# Patient Record
Sex: MALE | Race: WHITE | Employment: FULL TIME | URBAN - METROPOLITAN AREA
[De-identification: names, ages, dates, MRNs, and addresses within clinical notes are randomized per-mention and may not be internally consistent; named-entity substitution may affect disease eponyms.]

---

## 2017-04-05 ENCOUNTER — HOSPITAL ENCOUNTER (OUTPATIENT)
Dept: RADIATION ONCOLOGY | Age: 77
Discharge: HOME | End: 2017-04-05
Payer: MEDICARE

## 2017-04-05 DIAGNOSIS — C61 PROSTATE CANCER (HCC): ICD-10-CM

## 2017-04-05 LAB — PSA SERPL-MCNC: <0.1 NG/ML

## 2017-04-05 PROCEDURE — 84403 ASSAY OF TOTAL TESTOSTERONE: CPT | Performed by: RADIOLOGY

## 2017-04-05 PROCEDURE — 36415 COLL VENOUS BLD VENIPUNCTURE: CPT | Performed by: RADIOLOGY

## 2017-04-05 PROCEDURE — 84153 ASSAY OF PSA TOTAL: CPT | Performed by: RADIOLOGY

## 2017-04-06 ENCOUNTER — HOSPITAL ENCOUNTER (OUTPATIENT)
Dept: RADIATION ONCOLOGY | Age: 77
Discharge: HOME | End: 2017-04-06
Payer: MEDICARE

## 2017-04-06 VITALS
HEART RATE: 69 BPM | OXYGEN SATURATION: 94 % | RESPIRATION RATE: 18 BRPM | TEMPERATURE: 97.6 F | BODY MASS INDEX: 34 KG/M2 | SYSTOLIC BLOOD PRESSURE: 136 MMHG | DIASTOLIC BLOOD PRESSURE: 79 MMHG | WEIGHT: 223.6 LBS

## 2017-04-06 DIAGNOSIS — C61 PROSTATE CANCER (HCC): Primary | ICD-10-CM

## 2017-04-06 LAB
COMMENT, TESC2: ABNORMAL
TESTOST SERPL-MCNC: 207 NG/DL (ref 348–1197)

## 2017-04-06 PROCEDURE — 99211 OFF/OP EST MAY X REQ PHY/QHP: CPT

## 2017-04-06 RX ORDER — SILDENAFIL 100 MG/1
100 TABLET, FILM COATED ORAL AS NEEDED
Qty: 5 TAB | Refills: 3 | Status: SHIPPED | OUTPATIENT
Start: 2017-04-06 | End: 2017-08-28 | Stop reason: SDUPTHER

## 2017-04-06 NOTE — PROGRESS NOTES
Patient: Teddi Harada MRN: [id number]  SSN: [id number]    YOB: 1940  Age: 78 y.o. Sex: male      Other Providers:  William Holley MD.     DIAGNOSIS: Intermediate risk prostate cancer.  [id number].  PSA 6.5 (Density 0.14).  GS 4+3=7 in right mid 2 cores with 70% and 60% and PNI.  GS 3+4=7 in 25% and 40% also with PNI.  Right base GS 3+3=6 in 10%.   5/12 cores total.     PREVIOUS TREATMENT:  1)  TRUS Biopsy 12/16/15  2) 5/2/16:  HDR single fraction boost, 15 Gy  3) 6/21/16:  45 Gy whole pelvis radiation. HISTORY OF PRESENT ILLNESS:  Joana Klinefelter is a 70 y.o. male who I am seeing at the request of Dr. Linda Verde for prostate cancer back after my original consultation 1/28/16 and completion of radiation 6/21/16.  He was originally evaluated after routine screening documented an elevated PSA on serial values.      His pertinent past medical history includes hearing loss, HTN, and GERD.  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  He is  and a remote former smoker.  He has no family history of prostate cancer.    PSA History:    PSA 1/8/15 = 4.7  PSA 3/2/15 = 4.7  PSA 8/10/15 = 5.2, Free 0.84, %16.2  PSA 11/12/15 = 6.5,Free 1.01, %15.5    Biopsy Results:  12/16/15 45 cc gland.     GS 4+3=7 in right mid 2 cores with 70% and 60% and PNI.  GS 3+4=7 in 25% and 40% also with PNI.  Right base GS 3+3=6 in 10%.   5/12 cores total.      He was ultimately seen back in the urology office to discuss the results of the biopsy and management options 1/4/16.  Both radiation and surgical options were discussed and he has been referred to me to further discuss radiation as an option for management of his prostate cancer.  With his age, Dr. Linda Verde did not feel he was a good surgical candidate and with his disease, not a good seed candidate.  He also started him on a 6 month Eligard injection 1/4/16 and referred him to our office to discuss radiation.   He is having some mild fatigue and has noted some hot flashes (variable and less than even 1 per day and not severe).   We talked about several options but focused on RTOG 0924 as a potential option for him.  I ordered MRI and Bone scan and presented him in conference and we collectively agreed to proceed with radiation to his prostate and seminal vesicles. During radiation, he did well without complaints other than minimal frequency increase week 1 attributed to drinking more water.  He began having irritated hemorrhoids week 2 and recommended Preparation H and Witch hazel pads which was helpful.  He started probiotic week 3 and had good control of bowel function.  Week 4 having waxing and waning of constipation and diarrhea.   Week 5 feeling better. Completed all therapy wihtout treatment breaks. Mr. Lorena Castro returns today for his 10 months follow up following completion of his radiation therapy. He is doing very well and pleased with his care. He reports that he is basically back to his baseline and has been so since 5 months out. His bowel symptoms have resolved and reports he is having formed stools. He denies frequency, urgency, pain, hematuria or leakage. He states his flow is good. He has nocturia x 2. He went off flomax but started again with recurrent weakness and frequency after seeing Dr. Corinna Daniel in Feb. In regards to his energy, he states that his stamina is good during the day, able to continue working 2 jobs, but at night he sleeps longer than prior to radiation. He denies cough or shortness of breath. Denies pain. Overall all, Mr. Lorena Castro is doing well. He is not able to get an erection at all at anytime which does bother him but this is his only complaint. GENITOURINARY REVIEW OF SYSTEMS:  An EPIC 26 was completed pretreatment.  His AUA score was 8. His only complaint was frequency about the half the time. AUA at 1 month:  5  AUA at 3 months: 7  AUA at 10 months:  3. Back on Flomax.      UPDATED PAST MEDICAL HISTORY:  Since our prior encounter, Verónica Edmond has not been hospitalized. There have been no significant changes to the medical history. MEDICATIONS:     Current Outpatient Prescriptions:     sildenafil citrate (VIAGRA) 100 mg tablet, Take 1 Tab by mouth as needed. , Disp: 5 Tab, Rfl: 3    tamsulosin (FLOMAX) 0.4 mg capsule, Take 1 Cap by mouth daily for 90 days. , Disp: 90 Cap, Rfl: 3    triamterene-hydroCHLOROthiazide (MAXZIDE) 37.5-25 mg per tablet, Take 1 Tab by mouth daily. , Disp: 90 Tab, Rfl: 1    atenolol (TENORMIN) 50 mg tablet, Take 1 Tab by mouth daily. , Disp: 90 Tab, Rfl: 2    omeprazole (PRILOSEC) 20 mg capsule, Take 1 Cap by mouth daily. TAKE 1 CAPSULE BY MOUTH DAILY, Disp: 90 Cap, Rfl: 2    GLUCOSAMINE/D3/BOSWELLIA ROSA (OSTEO BI-FLEX, 5-LOXIN, PO), Take 1 Tab by mouth daily. , Disp: , Rfl:     multivitamin (ONE A DAY) tablet, Take 1 Tab by mouth daily. , Disp: , Rfl:     tadalafil (CIALIS) 5 mg tablet, Take 5 mg by mouth daily. (Patient taking differently: Take 5 mg by mouth as needed.), Disp: 30 Tab, Rfl: 2    Cetirizine 10 mg cap, Take 1 Tab by mouth daily. , Disp: , Rfl:     ALLERGIES:   No Known Allergies    PHYSICAL EXAMINATION:   ECOG Performance status 0  VITAL SIGNS:   Visit Vitals    /79    Pulse 69    Temp 97.6 °F (36.4 °C)    Resp 18    Wt 101.4 kg (223 lb 9.6 oz)    SpO2 94%    BMI 34 kg/m2        GENERAL: The patient is well-developed, ambulatory, alert and in no acute distress. HEENT: Head is normocephalic, atraumatic. NECK: Neck is supple with no masses. CARDIOVASCULAR: Heart is regular rate and rhythm. There are no murmurs rubs or gallups. RESPIRATORY: Lungs are clear to auscultation. There is normal respiratory effort. GASTROINTESTINAL: The abdomen is soft, non-tender. Digital rectal examination: deferred  MUSCULOSKELETAL: Extremities reveal no cyanosis, clubbing or edema.      LABORATORY:   09/23/2016:  PSA  <0.1 with Testosterone 75  4/5/17:  PSA <0.1, Testosterone 207    RADIOLOGY:  No recent imaging. TUMOR STATUS:  Responding based on PSA. IMPRESSION:  Orville Sanches is a 78 y.o. male now 10 months out from treatment. He is recovering as anticipated from his prior course of radiotherapy. He received his last Eligard injection on January 4, 2016. His PSA is <0.1 with a testosterone of 75. He denies hot flashes. Previous  urinary and bowel symptoms have resolved. He has good energy, working two jobs. PLAN:    1) Follow up with Dr. Estephania Walsh in 4 months and with our office in 6 months. Will alternate appointments with urology. 2) PSA/Testosterone prior to each visit. Plan to check serial values every 3 months for the first 2 years and q6 months thereafter generally. 3) Viagra script today for ED. Will defer further management to Dr. Estephania Walsh. Portions of this note were copied from prior encounters and reviewed for accuracy, currency, and represent documentation and tasks completed during this encounter. I verify and attest these portions to be unchanged from prior visits.     Ashtyn Roger MD  04/06/17

## 2017-04-06 NOTE — PROGRESS NOTES
Pt here today for FUP post RT to the prostate and has a very low AUA urinary score of 3/35 with mild c/o frequency and nocturia. 4/5/17 PSA <0.1, Testosterone 207. Pt is currently taking Flomax daily. Pt will return in 6 months with labs prior. He sees Dr. Vicki Corea 8/10/17.

## 2017-10-05 ENCOUNTER — HOSPITAL ENCOUNTER (OUTPATIENT)
Dept: RADIATION ONCOLOGY | Age: 77
Discharge: HOME | End: 2017-10-05
Payer: MEDICARE

## 2017-10-05 DIAGNOSIS — C61 PROSTATE CANCER (HCC): ICD-10-CM

## 2017-10-05 LAB — PSA SERPL-MCNC: <0.1 NG/ML

## 2017-10-05 PROCEDURE — 84403 ASSAY OF TOTAL TESTOSTERONE: CPT | Performed by: RADIOLOGY

## 2017-10-05 PROCEDURE — 36415 COLL VENOUS BLD VENIPUNCTURE: CPT | Performed by: RADIOLOGY

## 2017-10-05 PROCEDURE — 84153 ASSAY OF PSA TOTAL: CPT | Performed by: RADIOLOGY

## 2017-10-06 LAB — TESTOST SERPL-MCNC: 245 NG/DL (ref 264–916)

## 2017-10-12 ENCOUNTER — HOSPITAL ENCOUNTER (OUTPATIENT)
Dept: RADIATION ONCOLOGY | Age: 77
Discharge: HOME | End: 2017-10-12
Payer: MEDICARE

## 2017-10-12 VITALS
HEART RATE: 67 BPM | SYSTOLIC BLOOD PRESSURE: 127 MMHG | BODY MASS INDEX: 32.46 KG/M2 | TEMPERATURE: 98.6 F | DIASTOLIC BLOOD PRESSURE: 73 MMHG | RESPIRATION RATE: 18 BRPM | OXYGEN SATURATION: 95 % | WEIGHT: 213.5 LBS

## 2017-10-12 DIAGNOSIS — C61 PROSTATE CANCER (HCC): Primary | ICD-10-CM

## 2017-10-12 PROCEDURE — 99211 OFF/OP EST MAY X REQ PHY/QHP: CPT

## 2017-10-12 NOTE — PROGRESS NOTES
Pt here today for FUP post RT to the prostate which ended 6/2016. Pt stated he missed that last appt with Dr. Attila Glasgow and needs to reschedule it. His AUA score is 2/35 with his only c/o being nocturia.

## 2017-10-12 NOTE — PROGRESS NOTES
Patient: Rosaura Garay MRN: [id number]  SSN: [id number]    YOB: 1940  Age: 78 y.o. Sex: male      Other Providers:  Dominique Elizalde MD.     DIAGNOSIS: Intermediate risk prostate cancer.  [id number].  PSA 6.5 (Density 0.14).  GS 4+3=7 in right mid 2 cores with 70% and 60% and PNI.  GS 3+4=7 in 25% and 40% also with PNI.  Right base GS 3+3=6 in 10%.   5/12 cores total.     PREVIOUS TREATMENT:  1)  TRUS Biopsy 12/16/15  2) 5/2/16:  HDR single fraction boost, 15 Gy  3) 6/21/16:  45 Gy whole pelvis radiation. HISTORY OF PRESENT ILLNESS:  Suleiman Landry is a 70year old male initially seen by Dr. Leyla Guillaume at the request of Dr. Glendy Huffman for prostate cancer back after my original consultation 1/28/16 and completion of radiation 6/21/16.  He was originally evaluated after routine screening documented an elevated PSA on serial values.      His pertinent past medical history includes hearing loss, HTN, and GERD.  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  He is  and a remote former smoker.  He has no family history of prostate cancer.    PSA History:    PSA 1/8/15 = 4.7  PSA 3/2/15 = 4.7  PSA 8/10/15 = 5.2, Free 0.84, %16.2  PSA 11/12/15 = 6.5,Free 1.01, %15.5    Biopsy Results:  12/16/15 45 cc gland.     GS 4+3=7 in right mid 2 cores with 70% and 60% and PNI.  GS 3+4=7 in 25% and 40% also with PNI.  Right base GS 3+3=6 in 10%.   5/12 cores total.      He was ultimately seen back in the urology office to discuss the results of the biopsy and management options 1/4/16.  Both radiation and surgical options were discussed and he has been referred to me to further discuss radiation as an option for management of his prostate cancer.  With his age, Dr. Glendy Huffman did not feel he was a good surgical candidate and with his disease, not a good seed candidate.  He also started him on a 6 month Eligard injection 1/4/16 and referred him to our office to discuss radiation.   He is having some mild fatigue and has noted some hot flashes (variable and less than even 1 per day and not severe).   We talked about several options but focused on RTOG 0924 as a potential option for him.  I ordered MRI and Bone scan and presented him in conference and we collectively agreed to proceed with radiation to his prostate and seminal vesicles. During radiation, he did well without complaints other than minimal frequency increase week 1 attributed to drinking more water.  He began having irritated hemorrhoids week 2 and recommended Preparation H and Witch hazel pads which was helpful.  He started probiotic week 3 and had good control of bowel function.  Week 4 having waxing and waning of constipation and diarrhea.   Week 5 feeling better. Completed all therapy wihtout treatment breaks. INTERVAL HISTORY: Mr. Micheal Garcia returns today for his 12 month follow up following completion of his radiation therapy. He is doing very well. He reports that he is back to his baseline and has been so since 5 months out. He denies frequency, urgency, pain, hematuria or leakage. He states his flow is good. He has nocturia x 2. He went off Flomax, but started again with recurrent weakness and frequency after seeing Dr. Grace Kirby in February 2017. He continues to have occasional urgency with his bowels. He reports this has been an ongoing issue for about a year. In regards to his energy, he states that his stamina is good during the day, able to continue working 2 jobs. He denies cough or shortness of breath. Denies pain. Overall all, Mr. Micheal Garcia is doing well. He is not able to get an erection at all at anytime which does bother him. He has tried Viagra in the past with no result. He is planning a trip to [place] next week and [facility] in January 2018. GENITOURINARY REVIEW OF SYSTEMS:  An EPIC 26 was completed pretreatment.  His AUA score was 8. His only complaint was frequency about the half the time.   AUA at 1 month:  5  AUA at 3 months: 7  AUA at 10 months:  3  Back on Flomax  AUA at 16 months: 2    UPDATED PAST MEDICAL HISTORY:  Since our prior encounter, Isaak Barlow has not been hospitalized. There have been no significant changes to the medical history. MEDICATIONS:     Current Outpatient Prescriptions:     atenolol (TENORMIN) 50 mg tablet, Take 1 Tab by mouth daily. , Disp: 90 Tab, Rfl: 2    sildenafil citrate (VIAGRA) 100 mg tablet, Take 1 Tab by mouth as needed. , Disp: 5 Tab, Rfl: 3    omeprazole (PRILOSEC) 20 mg capsule, Take 1 Cap by mouth daily. TAKE 1 CAPSULE BY MOUTH DAILY, Disp: 90 Cap, Rfl: 2    triamterene-hydroCHLOROthiazide (MAXZIDE) 37.5-25 mg per tablet, Take 1 Tab by mouth daily. , Disp: 90 Tab, Rfl: 1    metoprolol tartrate (LOPRESSOR) 50 mg tablet, Take 1 Tab by mouth daily. , Disp: 90 Tab, Rfl: 1    GLUCOSAMINE/D3/BOSWELLIA ROSA (OSTEO BI-FLEX, 5-LOXIN, PO), Take 1 Tab by mouth daily. , Disp: , Rfl:     multivitamin (ONE A DAY) tablet, Take 1 Tab by mouth daily. , Disp: , Rfl:     Cetirizine 10 mg cap, Take 1 Tab by mouth daily. , Disp: , Rfl:     ALLERGIES:   No Known Allergies    PHYSICAL EXAMINATION:   ECOG Performance status 0  VITAL SIGNS:   There were no vitals taken for this visit. GENERAL: The patient is well-developed, ambulatory, alert and in no acute distress. HEENT: Head is normocephalic, atraumatic. NECK: Neck is supple with no masses. CARDIOVASCULAR: Heart is regular rate and rhythm. There are no murmurs rubs or gallups. RESPIRATORY: Lungs are clear to auscultation. There is normal respiratory effort. LABORATORY:   09/23/2016:  PSA  <0.1 with Testosterone 75  4/5/17:  PSA <0.1 with Testosterone 207  10/05/17: PSA <0.1 with Testosterone 245    RADIOLOGY:  No recent imaging. TUMOR STATUS:  Responding based on PSA. IMPRESSION:  Isaak Barlow is a 78 y.o. male now 16 months out from treatment. He is recovering as anticipated from his prior course of radiotherapy.  He received his last Eligard injection on January 4, 2016. His PSA is <0.1 with a testosterone of 245. His urinary function is back to his baseline. He denies any residual side effects related to his previous radiation therapy. PLAN:    1) Follow up with Dr. Prahci Bauer in 3 months and with our office in 6 months. Will alternate appointments with urology. 2) PSA/Testosterone prior to each visit. Plan to check serial values every 3 months for the first 2 years and q6 months thereafter generally. Koby Leach NP  10/12/17       Portions of this note were copied from prior encounters and reviewed for accuracy, currency, and represent documentation and tasks completed during this encounter. I verify and attest these portions to be unchanged from prior visits.

## 2018-04-12 ENCOUNTER — HOSPITAL ENCOUNTER (OUTPATIENT)
Dept: RADIATION ONCOLOGY | Age: 78
Discharge: HOME | End: 2018-04-12
Payer: MEDICARE

## 2018-04-12 DIAGNOSIS — C61 PROSTATE CANCER (HCC): Primary | ICD-10-CM

## 2018-04-12 DIAGNOSIS — C61 PROSTATE CANCER (HCC): ICD-10-CM

## 2018-04-12 PROCEDURE — 36415 COLL VENOUS BLD VENIPUNCTURE: CPT | Performed by: RADIOLOGY

## 2018-04-12 PROCEDURE — 84153 ASSAY OF PSA TOTAL: CPT | Performed by: RADIOLOGY

## 2018-04-12 PROCEDURE — 84403 ASSAY OF TOTAL TESTOSTERONE: CPT | Performed by: RADIOLOGY

## 2018-04-13 LAB
PSA SERPL DL<=0.01 NG/ML-MCNC: 0.02 NG/ML (ref 0–4)
TESTOST SERPL-MCNC: 247 NG/DL (ref 264–916)

## 2018-04-19 ENCOUNTER — HOSPITAL ENCOUNTER (OUTPATIENT)
Dept: RADIATION ONCOLOGY | Age: 78
Discharge: HOME | End: 2018-04-19
Payer: MEDICARE

## 2018-04-19 VITALS
HEART RATE: 71 BPM | BODY MASS INDEX: 32.12 KG/M2 | DIASTOLIC BLOOD PRESSURE: 85 MMHG | WEIGHT: 216.9 LBS | OXYGEN SATURATION: 95 % | TEMPERATURE: 97.7 F | RESPIRATION RATE: 16 BRPM | SYSTOLIC BLOOD PRESSURE: 136 MMHG | HEIGHT: 69 IN

## 2018-04-19 DIAGNOSIS — R30.0 BURNING WITH URINATION: Primary | ICD-10-CM

## 2018-04-19 DIAGNOSIS — R30.0 BURNING WITH URINATION: ICD-10-CM

## 2018-04-19 DIAGNOSIS — C61 PROSTATE CANCER (HCC): ICD-10-CM

## 2018-04-19 LAB
APPEARANCE UR: CLEAR
BACTERIA URNS QL MICRO: ABNORMAL /HPF
BILIRUB UR QL: NEGATIVE
CASTS URNS QL MICRO: 0 /LPF
COLOR UR: YELLOW
CRYSTALS URNS QL MICRO: 0 /LPF
EPI CELLS #/AREA URNS HPF: 0 /HPF
GLUCOSE UR STRIP.AUTO-MCNC: NEGATIVE MG/DL
HGB UR QL STRIP: ABNORMAL
KETONES UR QL STRIP.AUTO: NEGATIVE MG/DL
LEUKOCYTE ESTERASE UR QL STRIP.AUTO: NEGATIVE
MUCOUS THREADS URNS QL MICRO: 0 /LPF
NITRITE UR QL STRIP.AUTO: NEGATIVE
PH UR STRIP: 7 [PH] (ref 5–9)
PROT UR STRIP-MCNC: NEGATIVE MG/DL
RBC #/AREA URNS HPF: ABNORMAL /HPF
SP GR UR REFRACTOMETRY: 1.02 (ref 1–1.02)
UA: UC IF INDICATED,UAUC: ABNORMAL
UROBILINOGEN UR QL STRIP.AUTO: 0.2 EU/DL (ref 0.2–1)
WBC URNS QL MICRO: 0 /HPF

## 2018-04-19 PROCEDURE — 99211 OFF/OP EST MAY X REQ PHY/QHP: CPT

## 2018-04-19 PROCEDURE — 81001 URINALYSIS AUTO W/SCOPE: CPT | Performed by: NURSE PRACTITIONER

## 2018-04-19 NOTE — PROGRESS NOTES
Pt here today for FUP post RT to the prostate which ended 6/21/16, he also had HDR 5/2/16. His final ADT was 1/4/16.  4/12/18 PSA 0.022, Testosterone 247. Pt has an AUA score of 22/35 with multiple urinary c/o.

## 2018-04-19 NOTE — PROGRESS NOTES
Patient: Stephanie Muniz MRN: [id number]  SSN: [id number]    YOB: 1940  Age: 78 y.o. Sex: male      Other Providers:  Carolynn Arango MD.     DIAGNOSIS: Intermediate risk prostate cancer.  [id number].  PSA 6.5 (Density 0.14).  GS 4+3=7 in right mid 2 cores with 70% and 60% and PNI.  GS 3+4=7 in 25% and 40% also with PNI.  Right base GS 3+3=6 in 10%.   5/12 cores total.     PREVIOUS TREATMENT:  1)  TRUS Biopsy 12/16/15  2) 5/2/16:  HDR single fraction boost, 15 Gy  3) 6/21/16:  45 Gy whole pelvis radiation. HISTORY OF PRESENT ILLNESS:  Grecia Santiago is a 70year old male initially seen by Dr. Griselda Ibarra at the request of Dr. Barbara Frankel for prostate cancer back after my original consultation 1/28/16 and completion of radiation 6/21/16.  He was originally evaluated after routine screening documented an elevated PSA on serial values.      His pertinent past medical history includes hearing loss, HTN, and GERD.  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  He is  and a remote former smoker.  He has no family history of prostate cancer.    PSA History:    PSA 1/8/15 = 4.7  PSA 3/2/15 = 4.7  PSA 8/10/15 = 5.2, Free 0.84, %16.2  PSA 11/12/15 = 6.5,Free 1.01, %15.5    Biopsy Results:  12/16/15 45 cc gland.     GS 4+3=7 in right mid 2 cores with 70% and 60% and PNI.  GS 3+4=7 in 25% and 40% also with PNI.  Right base GS 3+3=6 in 10%.   5/12 cores total.      He was ultimately seen back in the urology office to discuss the results of the biopsy and management options 1/4/16.  Both radiation and surgical options were discussed and he has been referred to me to further discuss radiation as an option for management of his prostate cancer.  With his age, Dr. Barbara Frankel did not feel he was a good surgical candidate and with his disease, not a good seed candidate.  He also started him on a 6 month Eligard injection 1/4/16 and referred him to our office to discuss radiation.   He is having some mild fatigue and has noted some hot flashes (variable and less than even 1 per day and not severe).   We talked about several options but focused on RTOG 0924 as a potential option for him.  I ordered MRI and Bone scan and presented him in conference and we collectively agreed to proceed with radiation to his prostate and seminal vesicles. During radiation, he did well without complaints other than minimal frequency increase week 1 attributed to drinking more water.  He began having irritated hemorrhoids week 2 and recommended Preparation H and Witch hazel pads which was helpful.  He started probiotic week 3 and had good control of bowel function.  Week 4 having waxing and waning of constipation and diarrhea.   Week 5 feeling better. Completed all therapy wihtout treatment breaks. INTERVAL HISTORY: Mr. Esperanza Hackett returns today 22 months following completion of his radiation therapy. Mr. Esperanza Hackett complains of new onset urinary symptoms x2 weeks. He reports having urinary frequency, urgency, intermittency and incomplete emptying. He states he noticed hematuria x3 days that has since resolved. His urine is essentially clear. He denies fever. He does complain of fatigue. He has an AUA symptom score of 22/35 and is \"unhappy\" with is urinary function. He continues to have occasional urgency with his bowels. He reports this has been an ongoing issue since receiving radiation. In regards to his energy, he states that his stamina is worse for the last couple of weeks but continues working 2 jobs. He denies cough or shortness of breath. He is not able to get an erection at all at anytime which does bother him. He has tried Viagra in the past with no result. GENITOURINARY REVIEW OF SYSTEMS:  An EPIC 26 was completed pretreatment.  His AUA score was 8. His only complaint was frequency about the half the time.   AUA at 1 month:  5  AUA at 3 months: 7  AUA at 10 months:  3  Back on Flomax  AUA at 16 months: 2  AUA at 27 months: 22    UPDATED PAST MEDICAL HISTORY:  Since our prior encounter, Jacqueline Sterling has not been hospitalized. There have been no significant changes to the medical history. MEDICATIONS:     Current Outpatient Prescriptions:     metoprolol tartrate (LOPRESSOR) 50 mg tablet, Take 1 Tab by mouth daily. , Disp: 90 Tab, Rfl: 3    omeprazole (PRILOSEC) 20 mg capsule, Take 1 Cap by mouth daily. TAKE 1 CAPSULE BY MOUTH DAILY, Disp: 90 Cap, Rfl: 3    triamterene-hydroCHLOROthiazide (MAXZIDE) 37.5-25 mg per tablet, Take 1 Tab by mouth daily. , Disp: 90 Tab, Rfl: 3    GLUCOSAMINE/D3/BOSWELLIA ROSA (OSTEO BI-FLEX, 5-LOXIN, PO), Take 1 Tab by mouth daily. , Disp: , Rfl:     multivitamin (ONE A DAY) tablet, Take 1 Tab by mouth daily. , Disp: , Rfl:     Cetirizine 10 mg cap, Take 1 Tab by mouth daily. , Disp: , Rfl:     ALLERGIES:   No Known Allergies    PHYSICAL EXAMINATION:   ECOG Performance status 0  VITAL SIGNS:   Visit Vitals    /85 (BP 1 Location: Left arm, BP Patient Position: Sitting)    Pulse 71    Temp 97.7 °F (36.5 °C)    Resp 16    Ht 5' 9\" (1.753 m)    Wt 216 lb 14.4 oz (98.4 kg)    SpO2 95%    BMI 32.03 kg/m2        GENERAL: The patient is well-developed, ambulatory, alert and in no acute distress. LABORATORY:   09/23/2016:  PSA  <0.1 with Testosterone 75  4/5/17:  PSA <0.1 with Testosterone 207  10/05/17: PSA <0.1 with Testosterone 245  04/12/18: PSA 0.022 with testosterone of 247    RADIOLOGY:  No recent imaging. TUMOR STATUS:  Responding based on PSA. IMPRESSION:  Jacqueline Sterling is a 78 y.o. male now 16 months out from treatment. He is recovering as anticipated from his prior course of radiotherapy. He received his last Eligard injection on January 4, 2016. His PSA is <0.1 with a testosterone of 245. His urinary function is back to his baseline. He denies any residual side effects related to his previous radiation therapy.      PLAN:    1) Mr. Bernice White complains of new onset urinary symptoms x2 weeks. He reports having urinary frequency, urgency, intermittency and incomplete emptying. He states he noticed hematuria x3 days that has since resolved. His urine is essentially clear. We discussed radiation cystitis or UTI. We will obtain a urine sample and have asked him to push fluids and take motrin 200mg TID for 2-3 days for comfort. He denies fever. He does complain of fatigue. I will call him with result  Follow up with Dr. Alexa Hinkle in 3 months and with our office in 6 months. Will alternate appointments with urology. 2) PSA/Testosterone prior to each visit. He is scheduled with Dr. Alexa Hinkle in October. I will see him again in 3 months. At that time will change to yearly, alternating visits with dr. Alexa Hinkle (to be seen every 6 months). Marleni Salinas NP  04/19/18       Portions of this note were copied from prior encounters and reviewed for accuracy, currency, and represent documentation and tasks completed during this encounter. I verify and attest these portions to be unchanged from prior visits.

## 2018-07-19 ENCOUNTER — HOSPITAL ENCOUNTER (OUTPATIENT)
Dept: RADIATION ONCOLOGY | Age: 78
Discharge: HOME | End: 2018-07-19
Payer: MEDICARE

## 2018-07-19 DIAGNOSIS — C61 PROSTATE CANCER (HCC): ICD-10-CM

## 2018-07-19 LAB — PSA SERPL-MCNC: <0.1 NG/ML

## 2018-07-19 PROCEDURE — 84403 ASSAY OF TOTAL TESTOSTERONE: CPT | Performed by: NURSE PRACTITIONER

## 2018-07-19 PROCEDURE — 36415 COLL VENOUS BLD VENIPUNCTURE: CPT | Performed by: NURSE PRACTITIONER

## 2018-07-19 PROCEDURE — 84153 ASSAY OF PSA TOTAL: CPT | Performed by: NURSE PRACTITIONER

## 2018-07-20 LAB — TESTOST SERPL-MCNC: 265 NG/DL (ref 264–916)

## 2018-07-27 ENCOUNTER — HOSPITAL ENCOUNTER (OUTPATIENT)
Dept: RADIATION ONCOLOGY | Age: 78
Discharge: HOME | End: 2018-07-27
Payer: MEDICARE

## 2018-07-27 VITALS
HEART RATE: 72 BPM | RESPIRATION RATE: 16 BRPM | TEMPERATURE: 98.2 F | BODY MASS INDEX: 33.33 KG/M2 | HEIGHT: 68 IN | OXYGEN SATURATION: 95 % | DIASTOLIC BLOOD PRESSURE: 82 MMHG | WEIGHT: 219.9 LBS | SYSTOLIC BLOOD PRESSURE: 131 MMHG

## 2018-07-27 DIAGNOSIS — C61 PROSTATE CANCER (HCC): Primary | ICD-10-CM

## 2018-07-27 PROCEDURE — 99211 OFF/OP EST MAY X REQ PHY/QHP: CPT

## 2018-07-27 NOTE — PROGRESS NOTES
Pt here today for FUP post RT to the prostate which ended 6/21/16. He also completed HDR (High Dose Radiation) 5/2/16. His 7/19/18 PSA <0.1, testosterone 265. He will see Dr. Nan Osgood 10/30/18. Pt will return in 4/2019 with labs prior.

## 2018-07-27 NOTE — PROGRESS NOTES
Patient: Isaak Barlow MRN: [id number]  SSN: [id number] YOB: 1940  Age: 70 y.o. Sex: male Other Providers:  Kelly Gonzáles MD.  
 
DIAGNOSIS: Intermediate risk prostate cancer.  [id number].  PSA 6.5 (Density 0.14).  GS 4+3=7 in right mid 2 cores with 70% and 60% and PNI.  GS 3+4=7 in 25% and 40% also with PNI.  Right base GS 3+3=6 in 10%.   5/12 cores total.  
 
PREVIOUS TREATMENT: 
1)  TRUS Biopsy 12/16/15 
2) 5/2/16:  HDR single fraction boost, 15 Gy 3) 6/21/16:  45 Gy whole pelvis radiation. HISTORY OF PRESENT ILLNESS:  Guicho Starr is a 70year old male initially seen by Dr. Benny Hadley at the request of Dr. Brisa Edmonds for prostate cancer back after my original consultation 1/28/16 and completion of radiation 6/21/16.  He was originally evaluated after routine screening documented an elevated PSA on serial values.  His pertinent past medical history includes hearing loss, HTN, and GERD.  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  He is  and a remote former smoker.  He has no family history of prostate cancer.   
PSA History:   
PSA 1/8/15 = 4.7 PSA 3/2/15 = 4.7 PSA 8/10/15 = 5.2, Free 0.84, %16.2 PSA 11/12/15 = 6.5,Free 1.01, %15.5 Biopsy Results:  12/16/15 45 cc gland.    
GS 4+3=7 in right mid 2 cores with 70% and 60% and PNI.  GS 3+4=7 in 25% and 40% also with PNI.  Right base GS 3+3=6 in 10%.   5/12 cores total.  He was ultimately seen back in the urology office to discuss the results of the biopsy and management options 1/4/16.  Both radiation and surgical options were discussed and he has been referred to me to further discuss radiation as an option for management of his prostate cancer.  With his age, Dr. Brisa Edmonds did not feel he was a good surgical candidate and with his disease, not a good seed candidate.  He also started him on a 6 month Eligard injection 1/4/16 and referred him to our office to discuss radiation.   He is having some mild fatigue and has noted some hot flashes (variable and less than even 1 per day and not severe).   We talked about several options but focused on RTOG 0924 as a potential option for him.  I ordered MRI and Bone scan and presented him in conference and we collectively agreed to proceed with radiation to his prostate and seminal vesicles. During radiation, he did well without complaints other than minimal frequency increase week 1 attributed to drinking more water.  He began having irritated hemorrhoids week 2 and recommended Preparation H and Witch hazel pads which was helpful.  He started probiotic week 3 and had good control of bowel function.  Week 4 having waxing and waning of constipation and diarrhea.   Week 5 feeling better. Completed all therapy wihtout treatment breaks. INTERVAL HISTORY: Mr. Radha Lovett returns today 25 months following completion of his radiation therapy. He has an AUA symptom score of 3/35 and is \"delighted\" with is urinary function. He reports having nocturia x1 with intermittency and urgency less than 1 time in 5 . He has a strong urinary flow. He denies leakage. He continues to have urgency with his bowels at least once a week. He reports the stool is loose and will have 3-4 bowel movements every morning. Denies bloody or melanic stool. Denies pain/cramping. He reports this has been an ongoing issue since receiving radiation. His energy level is good. He is not able to get an erection that he finds to be a moderate problem. He has tried Viagra and Celexa in the past with no result. GENITOURINARY REVIEW OF SYSTEMS:  An EPIC 26 was completed pretreatment.  His AUA score was 8. His only complaint was frequency about the half the time. AUA at 1 month:  5 
AUA at 3 months: 7 AUA at 10 months:  3  Back on Flomax AUA at 16 months: 2 AUA at 20 months: 22 AUA at 25 months: 3 
 
UPDATED PAST MEDICAL HISTORY:  Since our prior encounter, Julee Rea has not been hospitalized. There have been no significant changes to the medical history. MEDICATIONS:  
 
Current Outpatient Prescriptions:  
  metoprolol tartrate (LOPRESSOR) 50 mg tablet, Take 1 Tab by mouth daily. , Disp: 90 Tab, Rfl: 3 
  omeprazole (PRILOSEC) 20 mg capsule, Take 1 Cap by mouth daily. TAKE 1 CAPSULE BY MOUTH DAILY, Disp: 90 Cap, Rfl: 3 
  triamterene-hydroCHLOROthiazide (MAXZIDE) 37.5-25 mg per tablet, Take 1 Tab by mouth daily. , Disp: 90 Tab, Rfl: 3 
  GLUCOSAMINE/D3/BOSWELLIA ROSA (OSTEO BI-FLEX, 5-LOXIN, PO), Take 1 Tab by mouth daily. , Disp: , Rfl:  
  multivitamin (ONE A DAY) tablet, Take 1 Tab by mouth daily. , Disp: , Rfl:  
  Cetirizine 10 mg cap, Take 1 Tab by mouth daily. , Disp: , Rfl: ALLERGIES:  
No Known Allergies PHYSICAL EXAMINATION:  
ECOG Performance status 0 
VITAL SIGNS: Blood pressure  131/82  Pulse 72  Temperature 98.2  Weight 219.9 GENERAL: The patient is well-developed, ambulatory, alert and in no acute distress. LABORATORY:  
09/23/2016:  PSA  <0.1 with Testosterone 75 
4/5/17:  PSA <0.1 with Testosterone 207 
10/05/17: PSA <0.1 with Testosterone 245 
04/12/18: PSA 0.022 with testosterone of 247 
07/19/18: PSA <0.1 with testosterone of 265 RADIOLOGY: 
No recent imaging. TUMOR STATUS:  Responding based on PSA. IMPRESSION:  Pedrito Bonilla is a 70 y.o. male now 16 months out from treatment. He is recovering as anticipated from his prior course of radiotherapy. He received his last Eligard injection on January 4, 2016. His PSA remains undetectable with rising testosterone level. His urinary function is back to his baseline. He denies any residual side effects related to his previous radiation therapy. PLAN:   
1) Mr. Qasim Brown is now 25 months out from completing radiation therapy and 30 months out from receiving a 6 month injection of Lupron. His PSA remains undetectable with rising testosterone level. He is scheduled with Dr. Eagle Desai in October with lab work.  I will see him again in April 2019. 2) Discussed adding fiber such as Fibercon or metamucil. He is due to have a colonoscopy this year. He will schedule visit with his GI. 3) I spent 25 minutes in the care of Mr. Rosario Baig today, over 75% of which was in direct counseling and ongoing management of his prostate cancer. All questions were answered to the best of my ability. Ana María Winter NP 
07/27/18 Portions of this note were copied from prior encounters and reviewed for accuracy, currency, and represent documentation and tasks completed during this encounter. I verify and attest these portions to be unchanged from prior visits.

## 2019-04-29 ENCOUNTER — APPOINTMENT (OUTPATIENT)
Dept: RADIATION ONCOLOGY | Age: 79
End: 2019-04-29

## 2019-05-01 ENCOUNTER — HOSPITAL ENCOUNTER (OUTPATIENT)
Dept: RADIATION ONCOLOGY | Age: 79
Discharge: HOME | End: 2019-05-01
Payer: MEDICARE

## 2019-05-01 DIAGNOSIS — C61 PROSTATE CANCER (HCC): ICD-10-CM

## 2019-05-01 LAB — PSA SERPL-MCNC: <0.1 NG/ML

## 2019-05-01 PROCEDURE — 84153 ASSAY OF PSA TOTAL: CPT

## 2019-05-01 PROCEDURE — 36415 COLL VENOUS BLD VENIPUNCTURE: CPT

## 2019-05-01 PROCEDURE — 84403 ASSAY OF TOTAL TESTOSTERONE: CPT

## 2019-05-02 ENCOUNTER — HOSPITAL ENCOUNTER (OUTPATIENT)
Dept: RADIATION ONCOLOGY | Age: 79
Discharge: HOME | End: 2019-05-02
Payer: MEDICARE

## 2019-05-02 VITALS
HEIGHT: 68 IN | OXYGEN SATURATION: 94 % | BODY MASS INDEX: 33.48 KG/M2 | DIASTOLIC BLOOD PRESSURE: 79 MMHG | WEIGHT: 220.9 LBS | RESPIRATION RATE: 16 BRPM | SYSTOLIC BLOOD PRESSURE: 125 MMHG | TEMPERATURE: 98.5 F | HEART RATE: 66 BPM

## 2019-05-02 DIAGNOSIS — C61 PROSTATE CANCER (HCC): Primary | ICD-10-CM

## 2019-05-02 LAB — TESTOST SERPL-MCNC: 240 NG/DL (ref 264–916)

## 2019-05-02 PROCEDURE — 99211 OFF/OP EST MAY X REQ PHY/QHP: CPT

## 2019-05-02 NOTE — PROGRESS NOTES
Patient: Clay De La Torre MRN: [id number]  SSN: [id number] YOB: 1940  Age: 70 y.o. Sex: male Other Providers:  Indio Oconnell MD.  
 
DIAGNOSIS: Intermediate risk prostate cancer.  [id number].  PSA 6.5 (Density 0.14).  GS 4+3=7 in right mid 2 cores with 70% and 60% and PNI.  GS 3+4=7 in 25% and 40% also with PNI.  Right base GS 3+3=6 in 10%.   5/12 cores total.  
 
PREVIOUS TREATMENT: 
1)  TRUS Biopsy 12/16/15 
2) 5/2/16:  HDR single fraction boost, 15 Gy 3) 6/21/16:  45 Gy whole pelvis radiation. HISTORY OF PRESENT ILLNESS:  Eve Sin is a 70year old male initially seen by Dr. Eneida Wright at the request of Dr. Grace Kirby for prostate cancer back after my original consultation 1/28/16 and completion of radiation 6/21/16.  He was originally evaluated after routine screening documented an elevated PSA on serial values.  His pertinent past medical history includes hearing loss, HTN, and GERD.  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  He is  and a remote former smoker.  He has no family history of prostate cancer.   
PSA History:   
PSA 1/8/15 = 4.7 PSA 3/2/15 = 4.7 PSA 8/10/15 = 5.2, Free 0.84, %16.2 PSA 11/12/15 = 6.5,Free 1.01, %15.5 Biopsy Results:  12/16/15 45 cc gland.    
GS 4+3=7 in right mid 2 cores with 70% and 60% and PNI.  GS 3+4=7 in 25% and 40% also with PNI.  Right base GS 3+3=6 in 10%.   5/12 cores total.  He was ultimately seen back in the urology office to discuss the results of the biopsy and management options 1/4/16.  Both radiation and surgical options were discussed and he has been referred to me to further discuss radiation as an option for management of his prostate cancer.  With his age, Dr. Grace Kirby did not feel he was a good surgical candidate and with his disease, not a good seed candidate.  He also started him on a 6 month Eligard injection 1/4/16 and referred him to our office to discuss radiation.   He is having some mild fatigue and has noted some hot flashes (variable and less than even 1 per day and not severe).   We talked about several options but focused on RTOG 0924 as a potential option for him.  I ordered MRI and Bone scan and presented him in conference and we collectively agreed to proceed with radiation to his prostate and seminal vesicles. During radiation, he did well without complaints other than minimal frequency increase week 1 attributed to drinking more water.  He began having irritated hemorrhoids week 2 and recommended Preparation H and Witch hazel pads which was helpful.  He started probiotic week 3 and had good control of bowel function.  Week 4 having waxing and waning of constipation and diarrhea.   Week 5 feeling better. Completed all therapy wihtout treatment breaks. INTERVAL HISTORY: Mr. Shayy Suazo returns today 35 months following completion of his radiation therapy. He has an AUA symptom score of 1/35 and is \"delighted\" with is urinary function. He reports having nocturia x1, otherwise denies any LUTS. He has a strong urinary flow. He denies leakage. He continues to have occasional urgency with his bowels. He reports the stool is loose and will have 3-4 bowel movements every morning. Denies bloody stool. His energy level is good. He has poor erectile function that he finds to be a small problem. He has tried Viagra and Cialis in the past with no result. GENITOURINARY REVIEW OF SYSTEMS:  An EPIC 26 was completed pretreatment.  His AUA score was 8. His only complaint was frequency about the half the time. AUA at 1 month:  5 
AUA at 3 months: 7 AUA at 10 months:  3  Back on Flomax AUA at 16 months: 2 AUA at 20 months: 22 AUA at 25 months: 3 AUA at 35 months: 1 
 
UPDATED PAST MEDICAL HISTORY:  Since our prior encounter, Sheree Hatfield has not been hospitalized. There have been no significant changes to the medical history.   
 
MEDICATIONS:  
 
 Current Outpatient Medications:  
  metoprolol tartrate (LOPRESSOR) 50 mg tablet, Take 1 Tab by mouth daily. , Disp: 90 Tab, Rfl: 3 
  omeprazole (PRILOSEC) 20 mg capsule, Take 1 Cap by mouth daily. TAKE 1 CAPSULE BY MOUTH DAILY, Disp: 90 Cap, Rfl: 3 
  triamterene-hydroCHLOROthiazide (MAXZIDE) 37.5-25 mg per tablet, Take 1 Tab by mouth daily. , Disp: 90 Tab, Rfl: 3 
  GLUCOSAMINE/D3/BOSWELLIA ROSA (OSTEO BI-FLEX, 5-LOXIN, PO), Take 1 Tab by mouth daily. , Disp: , Rfl:  
  multivitamin (ONE A DAY) tablet, Take 1 Tab by mouth daily. , Disp: , Rfl:  
  Cetirizine 10 mg cap, Take 1 Tab by mouth daily. , Disp: , Rfl: ALLERGIES:  
No Known Allergies PHYSICAL EXAMINATION:  
ECOG Performance status 0 
VITAL SIGNS: Blood pressure 125/79  Pulse 66  Temperature 98.5  Weight 220.9 GENERAL: The patient is well-developed, ambulatory, alert and in no acute distress. LABORATORY:  
09/23/2016:  PSA  <0.1 with Testosterone 75 
4/5/17:  PSA <0.1 with Testosterone 207 
10/05/17: PSA <0.1 with Testosterone 245 
04/12/18: PSA 0.022 with testosterone of 247 
07/19/18: PSA <0.1 with testosterone of 265 
05/02/19: PSA <0.1 with testosterone of 240 RADIOLOGY: 
No recent imaging. TUMOR STATUS:  Responding based on PSA. IMPRESSION:  Paola Wylie is a 70 y.o. male now 35 months out from treatment. He is recovering as anticipated from his prior course of radiotherapy. He received his last Eligard injection on January 4, 2016. His PSA remains undetectable with rising testosterone level. His urinary function is back to his baseline. He denies any residual side effects related to his previous radiation therapy. PLAN:   
1) Mr. Jose Ochoa is now 35 months out from completing radiation therapy and 30 months out from receiving a 6 month injection of Lupron. His PSA remains undetectable with rising testosterone level. He has asked to continue his visits/lab work with our office for now.  RTC 6 months with PSA/testosterone checked prior to his visit 2) I spent 20 minutes in the care of Mr. León Maria today, over 75% of which was in direct counseling and ongoing management of his prostate cancer. All questions were answered to the best of my ability. Paris Shore NP 
05/02/19 Portions of this note were copied from prior encounters and reviewed for accuracy, currency, and represent documentation and tasks completed during this encounter. I verify and attest these portions to be unchanged from prior visits.

## 2019-05-02 NOTE — PROGRESS NOTES
LABS (05/01/2019): 
-PSA: < 0.1 LABS (12/31/2018): 
-PSA: < 0.1 HDR 05/02/2016 RT End: 06/21/2016 Last Eligard: 01/04/2016 AUA/Epic:1 
-complaints of nocturia Gonzalez Chavira, [facility]

## 2019-11-07 ENCOUNTER — HOSPITAL ENCOUNTER (OUTPATIENT)
Dept: RADIATION ONCOLOGY | Age: 79
Discharge: HOME | End: 2019-11-07
Payer: MEDICARE

## 2019-11-07 DIAGNOSIS — C61 PROSTATE CANCER (HCC): ICD-10-CM

## 2019-11-07 LAB — PSA SERPL-MCNC: <0.1 NG/ML

## 2019-11-07 PROCEDURE — 84153 ASSAY OF PSA TOTAL: CPT

## 2019-11-07 PROCEDURE — 36415 COLL VENOUS BLD VENIPUNCTURE: CPT

## 2019-11-07 PROCEDURE — 84403 ASSAY OF TOTAL TESTOSTERONE: CPT

## 2019-11-08 LAB — TESTOST SERPL-MCNC: 228 NG/DL (ref 264–916)

## 2019-11-14 ENCOUNTER — HOSPITAL ENCOUNTER (OUTPATIENT)
Dept: RADIATION ONCOLOGY | Age: 79
Discharge: HOME | End: 2019-11-14
Payer: MEDICARE

## 2019-11-14 VITALS
DIASTOLIC BLOOD PRESSURE: 68 MMHG | RESPIRATION RATE: 16 BRPM | SYSTOLIC BLOOD PRESSURE: 126 MMHG | HEART RATE: 67 BPM | OXYGEN SATURATION: 96 % | WEIGHT: 219.8 LBS | BODY MASS INDEX: 33.42 KG/M2 | TEMPERATURE: 98.2 F

## 2019-11-14 DIAGNOSIS — C61 PROSTATE CANCER (HCC): Primary | ICD-10-CM

## 2019-11-14 PROCEDURE — 99211 OFF/OP EST MAY X REQ PHY/QHP: CPT

## 2019-11-14 NOTE — PROGRESS NOTES
6 Month Follow Up    RT End: 06/21/2016  HDR: 05/02/2016  6 Month ADT: 01/04/2016    LABS:  11/07/2019 - PSA: < 0.1, Test: 228  05/01/2019 - PSA: < 0.1, Test: 240    AUA/Epic: 1, delighted        Lan Herbert, [facility]

## 2019-11-14 NOTE — PROGRESS NOTES
Patient: Lino Greene MRN: [id number]  SSN: [id number]    YOB: 1940  Age: 70 y.o. Sex: male      Other Providers:  Jose Juan Landaverde MD.     DIAGNOSIS: Intermediate risk prostate cancer.  [id number].  PSA 6.5 (Density 0.14).  GS 4+3=7 in right mid 2 cores with 70% and 60% and PNI.  GS 3+4=7 in 25% and 40% also with PNI.  Right base GS 3+3=6 in 10%.   5/12 cores total.     PREVIOUS TREATMENT:  1)  TRUS Biopsy 12/16/15  2) 5/2/16:  HDR single fraction boost, 15 Gy  3) 6/21/16:  45 Gy whole pelvis radiation. HISTORY OF PRESENT ILLNESS:  Tom Bocanegra is a 70year old male initially seen by Dr. Taz Bueno at the request of Dr. Lorin Scott for prostate cancer back after my original consultation 1/28/16 and completion of radiation 6/21/16.  He was originally evaluated after routine screening documented an elevated PSA on serial values.      His pertinent past medical history includes hearing loss, HTN, and GERD.  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  He is  and a remote former smoker.  He has no family history of prostate cancer.    PSA History:    PSA 1/8/15 = 4.7  PSA 3/2/15 = 4.7  PSA 8/10/15 = 5.2, Free 0.84, %16.2  PSA 11/12/15 = 6.5,Free 1.01, %15.5    Biopsy Results:  12/16/15 45 cc gland.     GS 4+3=7 in right mid 2 cores with 70% and 60% and PNI.  GS 3+4=7 in 25% and 40% also with PNI.  Right base GS 3+3=6 in 10%.   5/12 cores total.      He was ultimately seen back in the urology office to discuss the results of the biopsy and management options 1/4/16.  Both radiation and surgical options were discussed and he has been referred to me to further discuss radiation as an option for management of his prostate cancer.  With his age, Dr. Lorin Scott did not feel he was a good surgical candidate and with his disease, not a good seed candidate.  He also started him on a 6 month Eligard injection 1/4/16 and referred him to our office to discuss radiation.   He is having some mild fatigue and has noted some hot flashes (variable and less than even 1 per day and not severe).   We talked about several options but focused on RTOG 0924 as a potential option for him.  I ordered MRI and Bone scan and presented him in conference and we collectively agreed to proceed with radiation to his prostate and seminal vesicles. During radiation, he did well without complaints other than minimal frequency increase week 1 attributed to drinking more water.  He began having irritated hemorrhoids week 2 and recommended Preparation H and Witch hazel pads which was helpful.  He started probiotic week 3 and had good control of bowel function.  Week 4 having waxing and waning of constipation and diarrhea.   Week 5 feeling better. Completed all therapy wihtout treatment breaks. INTERVAL HISTORY: Mr. Mason Phillips returns today 41 months following completion of his radiation therapy. He has an AUA symptom score of 1/35 and is \"delighted\" with is urinary function. He reports having nocturia x1, otherwise denies any LUTS. He has a strong urinary flow. He denies leakage. He continues to have occasional urgency with his bowels. He reports the stool is loose and will have 3-4 bowel movements every morning. Denies bloody stool. Good energy level. He has poor erectile function that he reports as a small problem. He has tried Viagra and Cialis in the past with no result. GENITOURINARY REVIEW OF SYSTEMS:  An EPIC 26 was completed pretreatment.  His AUA score was 8. His only complaint was frequency about the half the time. AUA at 1 month:  5  AUA at 3 months: 7  AUA at 10 months: 3  Back on Flomax  AUA at 16 months: 2  AUA at 20 months: 22  AUA at 25 months: 3  AUA at 35 months: 1  AUA at 41 months: 1    UPDATED PAST MEDICAL HISTORY:  Since our prior encounter, Bette Castellanos has not been hospitalized. There have been no significant changes to the medical history.      MEDICATIONS:     Current Outpatient Medications:     metoprolol tartrate (LOPRESSOR) 50 mg tablet, Take 1 Tab by mouth daily. , Disp: 90 Tab, Rfl: 3    omeprazole (PRILOSEC) 20 mg capsule, Take 1 Cap by mouth daily. TAKE 1 CAPSULE BY MOUTH DAILY, Disp: 90 Cap, Rfl: 3    triamterene-hydroCHLOROthiazide (MAXZIDE) 37.5-25 mg per tablet, Take 1 Tab by mouth daily. , Disp: 90 Tab, Rfl: 3    GLUCOSAMINE/D3/BOSWELLIA ROSA (OSTEO BI-FLEX, 5-LOXIN, PO), Take 1 Tab by mouth daily. , Disp: , Rfl:     multivitamin (ONE A DAY) tablet, Take 1 Tab by mouth daily. , Disp: , Rfl:     Cetirizine 10 mg cap, Take 1 Tab by mouth daily. , Disp: , Rfl:     ALLERGIES:   No Known Allergies    PHYSICAL EXAMINATION:   ECOG Performance status 0  VITAL SIGNS: Blood pressure   Pulse   Temperature   Weight     GENERAL: The patient is well-developed, ambulatory, alert and in no acute distress. LABORATORY:   09/23/2016:  PSA  <0.1 with Testosterone 75  4/5/17:  PSA <0.1 with Testosterone 207  10/05/17: PSA <0.1 with Testosterone 245  04/12/18: PSA 0.022 with testosterone of 247  07/19/18: PSA <0.1 with testosterone of 265  05/02/19: PSA <0.1 with testosterone of 240  11/07/19: PSA <0.1 with testosterone of 228    RADIOLOGY  No recent imaging    TUMOR STATUS:  Responding based on PSA. IMPRESSION:  John Ellsworth is a 70 y.o. male now 41 months out from treatment. He is recovering as anticipated from his prior course of radiotherapy. He received his last Eligard injection on January 4, 2016. His PSA remains undetectable with rising testosterone level. His urinary function is back to his baseline. He denies any residual side effects related to his previous radiation therapy. Mr Carine Mann is now 41 months out from completing radiation therapy. He continues to do well. PLAN:    1) PSA remains undetectable with testosterone level in the 200s. He has asked to continue his visits/lab work with our office.  RTC 6 months with PSA/testosterone checked prior to his visit   2) I spent 20 minutes in the care of Mr. Gilliam Ask today, over 75% of which was in direct counseling and ongoing management of his prostate cancer. All questions were answered to the best of my ability. Tisha Connors NP  11/14/19       Portions of this note were copied from prior encounters and reviewed for accuracy, currency, and represent documentation and tasks completed during this encounter. I verify and attest these portions to be unchanged from prior visits.

## 2020-02-14 ENCOUNTER — HOSPITAL ENCOUNTER (OUTPATIENT)
Dept: LAB | Age: 80
Discharge: HOME | End: 2020-02-14
Payer: MEDICARE

## 2020-02-14 DIAGNOSIS — E87.1 HYPONATREMIA: ICD-10-CM

## 2020-02-14 LAB
ANION GAP SERPL CALC-SCNC: 6 MMOL/L (ref 7–16)
BUN SERPL-MCNC: 13 MG/DL (ref 8–23)
CALCIUM SERPL-MCNC: 8.5 MG/DL (ref 8.3–10.4)
CHLORIDE SERPL-SCNC: 99 MMOL/L (ref 98–107)
CO2 SERPL-SCNC: 26 MMOL/L (ref 21–32)
CREAT SERPL-MCNC: 0.97 MG/DL (ref 0.8–1.5)
GLUCOSE SERPL-MCNC: 123 MG/DL (ref 65–100)
POTASSIUM SERPL-SCNC: 4 MMOL/L (ref 3.5–5.1)
SODIUM SERPL-SCNC: 131 MMOL/L (ref 136–145)

## 2020-02-14 PROCEDURE — 80048 BASIC METABOLIC PNL TOTAL CA: CPT

## 2020-02-14 PROCEDURE — 36415 COLL VENOUS BLD VENIPUNCTURE: CPT

## 2020-02-14 NOTE — PROGRESS NOTES
Sodium still too low. Hold Maxzide completely; Rx amlodipine 5mg daily. OV in two weeks for BP check.

## 2020-05-14 ENCOUNTER — HOSPITAL ENCOUNTER (OUTPATIENT)
Dept: RADIATION ONCOLOGY | Age: 80
Discharge: HOME | End: 2020-05-14
Payer: MEDICARE

## 2020-05-14 DIAGNOSIS — C61 PROSTATE CANCER (HCC): ICD-10-CM

## 2020-05-14 LAB — PSA SERPL-MCNC: <0.1 NG/ML

## 2020-05-14 PROCEDURE — 84153 ASSAY OF PSA TOTAL: CPT

## 2020-05-14 PROCEDURE — 36415 COLL VENOUS BLD VENIPUNCTURE: CPT

## 2020-05-14 PROCEDURE — 84403 ASSAY OF TOTAL TESTOSTERONE: CPT

## 2020-05-15 DIAGNOSIS — C61 PROSTATE CANCER (HCC): Primary | ICD-10-CM

## 2020-05-15 LAB — TESTOST SERPL-MCNC: 234 NG/DL (ref 264–916)

## 2020-11-09 ENCOUNTER — HOSPITAL ENCOUNTER (OUTPATIENT)
Dept: RADIATION ONCOLOGY | Age: 80
Discharge: HOME | End: 2020-11-09
Payer: MEDICARE

## 2020-11-09 DIAGNOSIS — C61 PROSTATE CANCER (HCC): ICD-10-CM

## 2020-11-09 LAB — PSA SERPL-MCNC: <0.1 NG/ML

## 2020-11-09 PROCEDURE — 84153 ASSAY OF PSA TOTAL: CPT

## 2020-11-09 PROCEDURE — 36415 COLL VENOUS BLD VENIPUNCTURE: CPT

## 2020-11-09 PROCEDURE — 84403 ASSAY OF TOTAL TESTOSTERONE: CPT

## 2020-11-10 LAB — TESTOST SERPL-MCNC: 293 NG/DL (ref 264–916)

## 2020-11-20 DIAGNOSIS — C61 PROSTATE CANCER (HCC): Primary | ICD-10-CM

## 2021-05-20 ENCOUNTER — HOSPITAL ENCOUNTER (OUTPATIENT)
Dept: RADIATION ONCOLOGY | Age: 81
Discharge: HOME | End: 2021-05-20
Payer: MEDICARE

## 2021-05-20 DIAGNOSIS — C61 PROSTATE CANCER (HCC): ICD-10-CM

## 2021-05-20 LAB — PSA SERPL-MCNC: 0.1 NG/ML

## 2021-05-20 PROCEDURE — 36415 COLL VENOUS BLD VENIPUNCTURE: CPT

## 2021-05-20 PROCEDURE — 84403 ASSAY OF TOTAL TESTOSTERONE: CPT

## 2021-05-20 PROCEDURE — 84153 ASSAY OF PSA TOTAL: CPT

## 2021-05-21 LAB
COMMENT, TESC2: NORMAL
TESTOST SERPL-MCNC: 286 NG/DL (ref 264–916)

## 2021-12-08 ENCOUNTER — HOSPITAL ENCOUNTER (OUTPATIENT)
Dept: RADIATION ONCOLOGY | Age: 81
Discharge: HOME | End: 2021-12-08
Payer: MEDICARE

## 2021-12-08 DIAGNOSIS — C61 PROSTATE CANCER (HCC): Primary | ICD-10-CM

## 2021-12-08 DIAGNOSIS — C61 PROSTATE CANCER (HCC): ICD-10-CM

## 2021-12-08 LAB — PSA SERPL-MCNC: <0.1 NG/ML

## 2021-12-08 PROCEDURE — 84403 ASSAY OF TOTAL TESTOSTERONE: CPT

## 2021-12-08 PROCEDURE — 36415 COLL VENOUS BLD VENIPUNCTURE: CPT

## 2021-12-08 PROCEDURE — 84153 ASSAY OF PSA TOTAL: CPT

## 2021-12-09 LAB — TESTOST SERPL-MCNC: 302 NG/DL (ref 264–916)

## 2022-01-31 PROBLEM — Z23 ENCOUNTER FOR IMMUNIZATION: Status: ACTIVE | Noted: 2022-01-31

## 2022-01-31 PROBLEM — Z00.00 MEDICARE ANNUAL WELLNESS VISIT, SUBSEQUENT: Status: ACTIVE | Noted: 2022-01-31

## 2022-01-31 PROBLEM — J30.9 ALLERGIC RHINITIS: Status: ACTIVE | Noted: 2022-01-31

## 2022-03-19 PROBLEM — J30.9 ALLERGIC RHINITIS: Status: ACTIVE | Noted: 2022-01-31

## 2022-03-19 PROBLEM — Z00.00 MEDICARE ANNUAL WELLNESS VISIT, SUBSEQUENT: Status: ACTIVE | Noted: 2022-01-31

## 2022-03-20 PROBLEM — Z23 ENCOUNTER FOR IMMUNIZATION: Status: ACTIVE | Noted: 2022-01-31

## 2022-04-20 DIAGNOSIS — C61 PROSTATE CANCER (HCC): Primary | ICD-10-CM

## 2022-04-21 DIAGNOSIS — E11.9 TYPE 2 DIABETES MELLITUS WITHOUT COMPLICATION, WITHOUT LONG-TERM CURRENT USE OF INSULIN (HCC): ICD-10-CM

## 2022-04-21 DIAGNOSIS — E78.2 MIXED HYPERLIPIDEMIA: Primary | ICD-10-CM

## 2022-06-23 ENCOUNTER — APPOINTMENT (OUTPATIENT)
Dept: RADIATION ONCOLOGY | Age: 82
End: 2022-06-23

## 2022-06-30 ENCOUNTER — APPOINTMENT (OUTPATIENT)
Dept: RADIATION ONCOLOGY | Age: 82
End: 2022-06-30

## 2022-07-26 DIAGNOSIS — E78.2 MIXED HYPERLIPIDEMIA: ICD-10-CM

## 2022-07-26 DIAGNOSIS — E11.9 TYPE 2 DIABETES MELLITUS WITHOUT COMPLICATION, WITHOUT LONG-TERM CURRENT USE OF INSULIN (HCC): ICD-10-CM

## 2022-07-26 LAB
CHOLEST SERPL-MCNC: 163 MG/DL
HDLC SERPL-MCNC: 32 MG/DL (ref 40–60)
HDLC SERPL: 5.1 {RATIO}
LDLC SERPL CALC-MCNC: 93.4 MG/DL
TRIGL SERPL-MCNC: 188 MG/DL (ref 35–150)
VLDLC SERPL CALC-MCNC: 37.6 MG/DL (ref 6–23)

## 2022-07-27 LAB
EST. AVERAGE GLUCOSE BLD GHB EST-MCNC: 148 MG/DL
HBA1C MFR BLD: 6.8 % (ref 4.8–5.6)

## 2022-07-28 ENCOUNTER — OFFICE VISIT (OUTPATIENT)
Dept: INTERNAL MEDICINE CLINIC | Facility: CLINIC | Age: 82
End: 2022-07-28
Payer: MEDICARE

## 2022-07-28 VITALS
DIASTOLIC BLOOD PRESSURE: 72 MMHG | HEART RATE: 71 BPM | WEIGHT: 221 LBS | BODY MASS INDEX: 33.49 KG/M2 | HEIGHT: 68 IN | SYSTOLIC BLOOD PRESSURE: 120 MMHG | OXYGEN SATURATION: 96 %

## 2022-07-28 DIAGNOSIS — Z23 ENCOUNTER FOR IMMUNIZATION: ICD-10-CM

## 2022-07-28 DIAGNOSIS — J30.1 SEASONAL ALLERGIC RHINITIS DUE TO POLLEN: ICD-10-CM

## 2022-07-28 DIAGNOSIS — K21.9 GASTROESOPHAGEAL REFLUX DISEASE, UNSPECIFIED WHETHER ESOPHAGITIS PRESENT: ICD-10-CM

## 2022-07-28 DIAGNOSIS — I10 ESSENTIAL HYPERTENSION: Primary | ICD-10-CM

## 2022-07-28 DIAGNOSIS — E11.9 TYPE 2 DIABETES MELLITUS WITHOUT COMPLICATION, WITHOUT LONG-TERM CURRENT USE OF INSULIN (HCC): ICD-10-CM

## 2022-07-28 DIAGNOSIS — E78.2 HYPERLIPIDEMIA, MIXED: ICD-10-CM

## 2022-07-28 DIAGNOSIS — C61 PROSTATE CANCER (HCC): ICD-10-CM

## 2022-07-28 DIAGNOSIS — N52.9 ED (ERECTILE DYSFUNCTION) OF ORGANIC ORIGIN: ICD-10-CM

## 2022-07-28 DIAGNOSIS — E66.9 OBESITY (BMI 30.0-34.9): ICD-10-CM

## 2022-07-28 PROBLEM — J30.9 ALLERGIC RHINITIS: Status: ACTIVE | Noted: 2022-01-31

## 2022-07-28 PROCEDURE — 1123F ACP DISCUSS/DSCN MKR DOCD: CPT | Performed by: INTERNAL MEDICINE

## 2022-07-28 PROCEDURE — G8417 CALC BMI ABV UP PARAM F/U: HCPCS | Performed by: INTERNAL MEDICINE

## 2022-07-28 PROCEDURE — 3044F HG A1C LEVEL LT 7.0%: CPT | Performed by: INTERNAL MEDICINE

## 2022-07-28 PROCEDURE — 99214 OFFICE O/P EST MOD 30 MIN: CPT | Performed by: INTERNAL MEDICINE

## 2022-07-28 PROCEDURE — 4004F PT TOBACCO SCREEN RCVD TLK: CPT | Performed by: INTERNAL MEDICINE

## 2022-07-28 PROCEDURE — G8428 CUR MEDS NOT DOCUMENT: HCPCS | Performed by: INTERNAL MEDICINE

## 2022-07-28 RX ORDER — METOPROLOL SUCCINATE 50 MG/1
50 TABLET, EXTENDED RELEASE ORAL DAILY
Qty: 90 TABLET | Refills: 2 | Status: SHIPPED | OUTPATIENT
Start: 2022-07-28

## 2022-07-28 RX ORDER — OMEPRAZOLE 20 MG/1
20 CAPSULE, DELAYED RELEASE ORAL DAILY
Qty: 90 CAPSULE | Refills: 2 | Status: SHIPPED | OUTPATIENT
Start: 2022-07-28

## 2022-07-28 RX ORDER — TRIAMTERENE AND HYDROCHLOROTHIAZIDE 37.5; 25 MG/1; MG/1
1 TABLET ORAL DAILY
Qty: 90 TABLET | Refills: 2 | Status: SHIPPED | OUTPATIENT
Start: 2022-07-28

## 2022-07-28 ASSESSMENT — PATIENT HEALTH QUESTIONNAIRE - PHQ9
SUM OF ALL RESPONSES TO PHQ QUESTIONS 1-9: 0
1. LITTLE INTEREST OR PLEASURE IN DOING THINGS: 0
2. FEELING DOWN, DEPRESSED OR HOPELESS: 0
SUM OF ALL RESPONSES TO PHQ9 QUESTIONS 1 & 2: 0

## 2022-07-28 ASSESSMENT — ENCOUNTER SYMPTOMS
EYE PAIN: 0
STRIDOR: 0
RECTAL PAIN: 0
CHOKING: 0
VOICE CHANGE: 0

## 2022-07-28 NOTE — PROGRESS NOTES
FOLLOWUP VISIT    Subjective:    Mr. Ac Balderas is a [de-identified] y.o., male,   Chief Complaint   Patient presents with    Follow-up       HPI:    Patient presents today for follow up of two or more chronic medical problems and review of labs. The patient has diabetes mellitus. The patient denies any symptoms of hypo or hyperglycemia. The patient has been attempting to be compliant with an ADA diet and an exercise program.  Admits he has a weakness for candy. Cut back on ice cream.      The patient has hyperlipidemia. The patient has been following a low cholesterol diet. The patient has GERD. The patient has been on attempted therapeutic lifestyle change including smaller more frequent meals and avoiding caffeine. The patient states that the GERD symptoms have been well controlled on current treatment and denies any dysphagia. The patient has hypertension. The patient has been on an attempted low sodium diet and has been trying to exercise and maintain a healthy weight. The patient reports good compliance with the blood pressure medications and good blood pressure readings on home / ambulatory monitoring.        The following portions of the patient's history were reviewed and updated as appropriate:      Past Medical History:   Diagnosis Date    BPH (benign prostatic hyperplasia)     Colon polyps     Diabetes mellitus, type 2 ([address]     Dyspnea     9/9/15 exercise nuclear stress test to 9.7 METS/87% MHR normal.      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       Family History   Problem Relation Age of Onset    Heart Disease Mother     Hypertension Father     Heart Disease Father     High Cholesterol Sister     High Cholesterol Father     Hypertension Mother     High Cholesterol Mother        Social History     Socioeconomic History    Marital status:      Spouse name: Not on file    Number of children: Not on file    Years of education: Not on file    Highest education level: Not on file   Occupational History    Not on file   Tobacco Use    Smoking status: Former     Types: Cigarettes     Quit date: 10/26/1980     Years since quittin.7    Smokeless tobacco: Never   Substance and Sexual Activity    Alcohol use: No    Drug use: No    Sexual activity: Not on file   Other Topics Concern    Not on file   Social History Narrative    Not on file     Social Determinants of Health     Financial Resource Strain: Not on file   Food Insecurity: Not on file   Transportation Needs: Not on file   Physical Activity: Not on file   Stress: Not on file   Social Connections: Not on file   Intimate Partner Violence: Not on file   Housing Stability: Not on file       Current Outpatient Medications   Medication Sig Dispense Refill    azelastine HCl 0.15 % SOLN 1 spray by Nasal route 2 times daily      Cetirizine HCl 10 MG CAPS Take 1 tablet by mouth daily      metoprolol succinate (TOPROL XL) 50 MG extended release tablet Take 50 mg by mouth daily      omeprazole (PRILOSEC) 20 MG delayed release capsule TAKE 1 CAPSULE BY MOUTH DAILY      sildenafil (VIAGRA) 100 MG tablet Take 100 mg by mouth as needed      triamterene-hydroCHLOROthiazide (MAXZIDE-25) 37.5-25 MG per tablet Take 1 tablet by mouth daily       No current facility-administered medications for this visit. Allergies as of 2022    (No Known Allergies)       Review of Systems   Constitutional:  Negative for activity change and appetite change. HENT:  Negative for drooling and voice change. Eyes:  Negative for pain. Respiratory:  Negative for choking and stridor. Gastrointestinal:  Negative for rectal pain. Endocrine: Negative for polydipsia and polyphagia. Genitourinary:  Negative for enuresis and penile pain. Musculoskeletal:  Negative for gait problem and neck stiffness. Skin:  Negative for pallor. Allergic/Immunologic: Negative for immunocompromised state. Neurological:  Negative for facial asymmetry and speech difficulty. Hematological:  Does not bruise/bleed easily. Psychiatric/Behavioral:  Negative for self-injury. The patient is not hyperactive. Patient Care Team:  Ayden Doran MD as PCP - General  Ayden Dorna MD as PCP - [facility]    Objective:    /72   Pulse 71   Ht 5' 8\" (1.727 m)   Wt 221 lb (100.2 kg)   SpO2 96%   BMI 33.60 kg/m²     Physical Exam  Vitals reviewed. Constitutional:       General: He is not in acute distress. Appearance: Normal appearance. He is not toxic-appearing. HENT:      Head: Normocephalic and atraumatic. Right Ear: Tympanic membrane, ear canal and external ear normal.      Left Ear: Tympanic membrane, ear canal and external ear normal.      Nose: Nose normal.      Mouth/Throat:      Mouth: Mucous membranes are moist.      Pharynx: Oropharynx is clear. Eyes:      General: No scleral icterus. Extraocular Movements: Extraocular movements intact. Conjunctiva/sclera: Conjunctivae normal.      Pupils: Pupils are equal, round, and reactive to light. Cardiovascular:      Rate and Rhythm: Normal rate and regular rhythm. Pulses: Normal pulses. Heart sounds: Normal heart sounds. Pulmonary:      Breath sounds: Normal breath sounds. Abdominal:      General: Abdomen is flat. Bowel sounds are normal.      Palpations: Abdomen is soft. There is no mass. Tenderness: There is no guarding or rebound. Musculoskeletal:         General: Normal range of motion. Cervical back: Normal range of motion and neck supple. Skin:     General: Skin is warm and dry. Coloration: Skin is not jaundiced. continue the current treatment. Orders:  -     omeprazole (PRILOSEC) 20 MG delayed release capsule; Take 1 capsule by mouth in the morning. TAKE 1 CAPSULE BY MOUTH DAILY. , Disp-90 capsule, R-2Normal  3. Prostate cancer [facility])  Overview:  S/P radiation therapy completed in 2015. Followed by [facility], Tyra Romero NP.        4. Obesity (BMI 30.0-34.9)  Overview:  Reviewed the patient's BMI. Discussed the need to lose weight in order to avoid many preventable illnesses. Discussed diet, exercise, and weight loss strategies. 5. Hyperlipidemia, mixed  Overview:  7/26/22 total 163 HDL 32 LDL 93   on diet therapy. Labs were reviewed and discussed with patient. LDL < 100 on diet therapy. The patient will continue the current treatment. Orders:  -     Lipid Panel; Future  -     Comprehensive Metabolic Panel; Future  6. ED (erectile dysfunction) of organic origin  Overview:  Improved with Viagra PRN. The patient will continue the current treatment. 7. Type 2 diabetes mellitus without complication, without long-term current use of insulin [address]  Overview:  7/26/22 A1C 6.8  1/20/21 fasting glucose 119, A1C 6.6  7/20/20 (non?) fasting glucose 153, A1C 6.3    Labs were reviewed and discussed with the patient. The patient had been prediabetic but based on his most recent A1C > 6.4 he now meets criteria for diabetes mellitus. Discussed treatment options. The patient believes he can control things with diet and exercise. Low threshold to add Metformin men should his A1c rise further. Orders:  -     Hemoglobin A1C; Future  -     Microalbumin / Creatinine Urine Ratio; Future  8. Seasonal allergic rhinitis due to pollen  Overview:  Symptoms controlled with Astelin. The patient will continue the current treatment. 9. Encounter for immunization  Overview:  Vaccinations reviewed. Discussed Covid booster. Declined Prevnar 20 today because he is leaving for vacation tomorrow. Plan next time. The patient and/or patient representative voiced understanding and agreement with the current diagnoses, recommendations, and possible side effects. Return in about 6 months (around 1/28/2023) for annual wellness, review labs.

## 2022-09-13 ENCOUNTER — HOSPITAL ENCOUNTER (OUTPATIENT)
Dept: LAB | Age: 82
Setting detail: SPECIMEN
Discharge: HOME | End: 2022-09-16

## 2022-09-13 PROCEDURE — 88312 SPECIAL STAINS GROUP 1: CPT

## 2022-09-13 PROCEDURE — 88305 TISSUE EXAM BY PATHOLOGIST: CPT

## 2023-01-24 DIAGNOSIS — E78.2 HYPERLIPIDEMIA, MIXED: ICD-10-CM

## 2023-01-24 DIAGNOSIS — E11.9 TYPE 2 DIABETES MELLITUS WITHOUT COMPLICATION, WITHOUT LONG-TERM CURRENT USE OF INSULIN (HCC): ICD-10-CM

## 2023-01-24 LAB
CREAT UR-MCNC: 70 MG/DL
MICROALBUMIN UR-MCNC: 0.65 MG/DL (ref 0–3)
MICROALBUMIN/CREAT UR-RTO: 9 MG/G (ref 0–30)

## 2023-01-25 LAB
ALBUMIN SERPL-MCNC: 3.9 G/DL (ref 3.2–4.6)
ALBUMIN/GLOB SERPL: 1.1 (ref 0.4–1.6)
ALP SERPL-CCNC: 94 U/L (ref 50–136)
ALT SERPL-CCNC: 56 U/L (ref 12–65)
ANION GAP SERPL CALC-SCNC: 8 MMOL/L (ref 2–11)
AST SERPL-CCNC: 34 U/L (ref 15–37)
BILIRUB SERPL-MCNC: 1.4 MG/DL (ref 0.2–1.1)
BUN SERPL-MCNC: 14 MG/DL (ref 8–23)
CALCIUM SERPL-MCNC: 8.4 MG/DL (ref 8.3–10.4)
CHLORIDE SERPL-SCNC: 102 MMOL/L (ref 101–110)
CHOLEST SERPL-MCNC: 150 MG/DL
CO2 SERPL-SCNC: 26 MMOL/L (ref 21–32)
CREAT SERPL-MCNC: 1 MG/DL (ref 0.8–1.5)
EST. AVERAGE GLUCOSE BLD GHB EST-MCNC: 123 MG/DL
GLOBULIN SER CALC-MCNC: 3.7 G/DL (ref 2.8–4.5)
GLUCOSE SERPL-MCNC: 120 MG/DL (ref 65–100)
HBA1C MFR BLD: 5.9 % (ref 4.8–5.6)
HDLC SERPL-MCNC: 33 MG/DL (ref 40–60)
HDLC SERPL: 4.5
LDLC SERPL CALC-MCNC: 82 MG/DL
POTASSIUM SERPL-SCNC: 4.2 MMOL/L (ref 3.5–5.1)
PROT SERPL-MCNC: 7.6 G/DL (ref 6.3–8.2)
SODIUM SERPL-SCNC: 136 MMOL/L (ref 133–143)
TRIGL SERPL-MCNC: 175 MG/DL (ref 35–150)
VLDLC SERPL CALC-MCNC: 35 MG/DL (ref 6–23)

## 2023-01-26 ENCOUNTER — OFFICE VISIT (OUTPATIENT)
Dept: INTERNAL MEDICINE CLINIC | Facility: CLINIC | Age: 83
End: 2023-01-26
Payer: MEDICARE

## 2023-01-26 VITALS
OXYGEN SATURATION: 95 % | RESPIRATION RATE: 16 BRPM | BODY MASS INDEX: 33.46 KG/M2 | TEMPERATURE: 97.2 F | WEIGHT: 220.8 LBS | DIASTOLIC BLOOD PRESSURE: 80 MMHG | HEART RATE: 79 BPM | SYSTOLIC BLOOD PRESSURE: 138 MMHG | HEIGHT: 68 IN

## 2023-01-26 DIAGNOSIS — I10 HYPERTENSION, ESSENTIAL: ICD-10-CM

## 2023-01-26 DIAGNOSIS — K21.9 GASTROESOPHAGEAL REFLUX DISEASE, UNSPECIFIED WHETHER ESOPHAGITIS PRESENT: ICD-10-CM

## 2023-01-26 DIAGNOSIS — Z00.00 MEDICARE ANNUAL WELLNESS VISIT, SUBSEQUENT: Primary | ICD-10-CM

## 2023-01-26 DIAGNOSIS — E66.9 OBESITY (BMI 30.0-34.9): ICD-10-CM

## 2023-01-26 DIAGNOSIS — E78.2 HYPERLIPIDEMIA, MIXED: ICD-10-CM

## 2023-01-26 DIAGNOSIS — Z23 ENCOUNTER FOR IMMUNIZATION: ICD-10-CM

## 2023-01-26 DIAGNOSIS — K63.5 POLYP OF COLON, UNSPECIFIED PART OF COLON, UNSPECIFIED TYPE: ICD-10-CM

## 2023-01-26 DIAGNOSIS — E11.9 TYPE 2 DIABETES MELLITUS WITHOUT COMPLICATION, WITHOUT LONG-TERM CURRENT USE OF INSULIN (HCC): ICD-10-CM

## 2023-01-26 DIAGNOSIS — C61 PROSTATE CANCER (HCC): ICD-10-CM

## 2023-01-26 PROCEDURE — 3075F SYST BP GE 130 - 139MM HG: CPT | Performed by: INTERNAL MEDICINE

## 2023-01-26 PROCEDURE — G0439 PPPS, SUBSEQ VISIT: HCPCS | Performed by: INTERNAL MEDICINE

## 2023-01-26 PROCEDURE — 3044F HG A1C LEVEL LT 7.0%: CPT | Performed by: INTERNAL MEDICINE

## 2023-01-26 PROCEDURE — G8484 FLU IMMUNIZE NO ADMIN: HCPCS | Performed by: INTERNAL MEDICINE

## 2023-01-26 PROCEDURE — G8417 CALC BMI ABV UP PARAM F/U: HCPCS | Performed by: INTERNAL MEDICINE

## 2023-01-26 PROCEDURE — 99214 OFFICE O/P EST MOD 30 MIN: CPT | Performed by: INTERNAL MEDICINE

## 2023-01-26 PROCEDURE — 1123F ACP DISCUSS/DSCN MKR DOCD: CPT | Performed by: INTERNAL MEDICINE

## 2023-01-26 PROCEDURE — 3079F DIAST BP 80-89 MM HG: CPT | Performed by: INTERNAL MEDICINE

## 2023-01-26 PROCEDURE — G8427 DOCREV CUR MEDS BY ELIG CLIN: HCPCS | Performed by: INTERNAL MEDICINE

## 2023-01-26 PROCEDURE — 4004F PT TOBACCO SCREEN RCVD TLK: CPT | Performed by: INTERNAL MEDICINE

## 2023-01-26 ASSESSMENT — ENCOUNTER SYMPTOMS
EYE PAIN: 0
RECTAL PAIN: 0
VOICE CHANGE: 0
STRIDOR: 0
CHOKING: 0

## 2023-01-26 ASSESSMENT — PATIENT HEALTH QUESTIONNAIRE - PHQ9
SUM OF ALL RESPONSES TO PHQ9 QUESTIONS 1 & 2: 0
SUM OF ALL RESPONSES TO PHQ QUESTIONS 1-9: 0
SUM OF ALL RESPONSES TO PHQ QUESTIONS 1-9: 0
2. FEELING DOWN, DEPRESSED OR HOPELESS: 0
SUM OF ALL RESPONSES TO PHQ QUESTIONS 1-9: 0
SUM OF ALL RESPONSES TO PHQ QUESTIONS 1-9: 0
1. LITTLE INTEREST OR PLEASURE IN DOING THINGS: 0

## 2023-01-26 ASSESSMENT — LIFESTYLE VARIABLES
HOW MANY STANDARD DRINKS CONTAINING ALCOHOL DO YOU HAVE ON A TYPICAL DAY: PATIENT DOES NOT DRINK
HOW OFTEN DO YOU HAVE A DRINK CONTAINING ALCOHOL: NEVER

## 2023-01-26 NOTE — PROGRESS NOTES
FOLLOWUP VISIT and MEDICARE WELLNESS    Subjective:    Mr. Valerie Rincon is a [de-identified] y.o., male,   Chief Complaint   Patient presents with    Medicare AW    Follow-up       HPI:    Patient presents today for follow up of two or more chronic medical problems and review of labs. The patient is also here for the annual Medicare wellness visit. The patient has GERD. The patient has been on attempted therapeutic lifestyle change including smaller more frequent meals and avoiding caffeine. The patient states that the GERD symptoms have been well controlled on current treatment and denies any dysphagia. The patient has hypertension. The patient has been on an attempted low sodium diet and has been trying to exercise and maintain a healthy weight. The patient reports good compliance with the blood pressure medications. The patient has diabetes mellitus. The patient denies any symptoms of hypo or hyperglycemia.   The patient has been attempting to be compliant with an ADA diet and an exercise program.     The following portions of the patient's history were reviewed and updated as appropriate:      Past Medical History:   Diagnosis Date    BPH (benign prostatic hyperplasia)     Colon polyps     Diabetes mellitus, type 2 ([address]     Dyspnea     9/9/15 exercise nuclear stress test to 9.7 METS/87% MHR normal.      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       Family History   Problem Relation Age of Onset    Heart Disease Mother     Hypertension Father     Heart Disease Father     High Cholesterol Sister     High Cholesterol Father     Hypertension Mother     High Cholesterol Mother        Social History     Socioeconomic History    Marital status:      Spouse name: Not on file    Number of children: Not on file    Years of education: Not on file    Highest education level: Not on file   Occupational History    Not on file   Tobacco Use    Smoking status: Former     Types: Cigarettes     Quit date: 10/26/1980     Years since quittin.2    Smokeless tobacco: Never   Substance and Sexual Activity    Alcohol use: No    Drug use: No    Sexual activity: Not on file   Other Topics Concern    Not on file   Social History Narrative    Not on file     Social Determinants of Health     Financial Resource Strain: Not on file   Food Insecurity: Not on file   Transportation Needs: Not on file   Physical Activity: Insufficiently Active    Days of Exercise per Week: 3 days    Minutes of Exercise per Session: 30 min   Stress: Not on file   Social Connections: Not on file   Intimate Partner Violence: Not on file   Housing Stability: Not on file       Current Outpatient Medications   Medication Sig Dispense Refill    metoprolol succinate (TOPROL XL) 50 MG extended release tablet Take 1 tablet by mouth in the morning. 90 tablet 2    triamterene-hydroCHLOROthiazide (MAXZIDE-25) 37.5-25 MG per tablet Take 1 tablet by mouth in the morning. 90 tablet 2    omeprazole (PRILOSEC) 20 MG delayed release capsule Take 1 capsule by mouth in the morning. TAKE 1 CAPSULE BY MOUTH DAILY. 90 capsule 2    azelastine HCl 0.15 % SOLN 1 spray by Nasal route 2 times daily      Cetirizine HCl 10 MG CAPS Take 1 tablet by mouth daily      sildenafil (VIAGRA) 100 MG tablet Take 100 mg by mouth as needed       No current facility-administered medications for this visit. Allergies as of 2023    (No Known Allergies)       Review of Systems   Constitutional:  Negative for activity change and appetite change. HENT:  Negative for drooling and voice change. Eyes:  Negative for pain.    Respiratory:  Negative for choking and stridor. Gastrointestinal:  Negative for rectal pain. Endocrine: Negative for polydipsia and polyphagia. Genitourinary:  Negative for enuresis and penile pain. Musculoskeletal:  Negative for gait problem and neck stiffness. Skin:  Negative for pallor. Allergic/Immunologic: Negative for immunocompromised state. Neurological:  Negative for facial asymmetry and speech difficulty. Hematological:  Does not bruise/bleed easily. Psychiatric/Behavioral:  Negative for self-injury. The patient is not hyperactive. Patient Care Team:  Alli Healy MD as PCP - General  Alli Healy MD as PCP - [facility]    Objective:    /80 (Site: Right Upper Arm, Position: Sitting)   Pulse 79   Temp 97.2 °F (36.2 °C) (Temporal)   Resp 16   Ht 5' 8\" (1.727 m)   Wt 220 lb 12.8 oz (100.2 kg)   SpO2 95%   BMI 33.57 kg/m²     Physical Exam  Vitals reviewed. Constitutional:       General: He is not in acute distress. Appearance: Normal appearance. He is not toxic-appearing. HENT:      Head: Normocephalic and atraumatic. Right Ear: Tympanic membrane, ear canal and external ear normal.      Left Ear: Tympanic membrane, ear canal and external ear normal.      Nose: Nose normal.      Mouth/Throat:      Mouth: Mucous membranes are moist.      Pharynx: Oropharynx is clear. Eyes:      General: No scleral icterus. Extraocular Movements: Extraocular movements intact. Conjunctiva/sclera: Conjunctivae normal.      Pupils: Pupils are equal, round, and reactive to light. Cardiovascular:      Rate and Rhythm: Normal rate and regular rhythm. Pulses: Normal pulses. Heart sounds: Normal heart sounds. Pulmonary:      Breath sounds: Normal breath sounds. Abdominal:      General: Abdomen is flat. Bowel sounds are normal.      Palpations: Abdomen is soft. There is no mass. Tenderness: There is no guarding or rebound.    Musculoskeletal:         General: Normal range of motion. Cervical back: Normal range of motion and neck supple. Skin:     General: Skin is warm and dry. Coloration: Skin is not jaundiced. Neurological:      Mental Status: He is alert and oriented to person, place, and time. Mental status is at baseline. Psychiatric:         Behavior: Behavior normal.         Thought Content: Thought content normal.            Orders Only on 01/24/2023   Component Date Value Ref Range Status    Sodium 01/24/2023 136  133 - 143 mmol/L Final    Potassium 01/24/2023 4.2  3.5 - 5.1 mmol/L Final    Chloride 01/24/2023 102  101 - 110 mmol/L Final    CO2 01/24/2023 26  21 - 32 mmol/L Final    Anion Gap 01/24/2023 8  2 - 11 mmol/L Final    Glucose 01/24/2023 120 (A)  65 - 100 mg/dL Final    BUN 01/24/2023 14  8 - 23 MG/DL Final    Creatinine 01/24/2023 1.00  0.8 - 1.5 MG/DL Final    Est, Glom Filt Rate 01/24/2023 >60  >60 ml/min/1.73m2 Final    Comment:   Pediatric calculator link: [employer].at. org/professionals/kdoqi/gfr_calculatorped    These results are not intended for use in patients <25years of age. eGFR results are calculated without a race factor using  the 2021 CKD-EPI equation. Careful clinical correlation is recommended, particularly when comparing to results calculated using previous equations. The CKD-EPI equation is less accurate in patients with extremes of muscle mass, extra-renal metabolism of creatinine, excessive creatine ingestion, or following therapy that affects renal tubular secretion.       Calcium 01/24/2023 8.4  8.3 - 10.4 MG/DL Final    Total Bilirubin 01/24/2023 1.4 (A)  0.2 - 1.1 MG/DL Final    ALT 01/24/2023 56  12 - 65 U/L Final    AST 01/24/2023 34  15 - 37 U/L Final    Alk Phosphatase 01/24/2023 94  50 - 136 U/L Final    Total Protein 01/24/2023 7.6  6.3 - 8.2 g/dL Final    Albumin 01/24/2023 3.9  3.2 - 4.6 g/dL Final    Globulin 01/24/2023 3.7  2.8 - 4.5 g/dL Final    Albumin/Globulin Ratio 01/24/2023 1.1  0.4 - 1.6 Final    Microalbumin, Random Urine 01/24/2023 0.65  0.0 - 3.0 MG/DL Final    Creatinine, Ur 01/24/2023 70.00  mg/dL Final    Microalbumin Creatinine Ratio 01/24/2023 9  0 - 30 mg/g Final    Hemoglobin A1C 01/24/2023 5.9 (A)  4.8 - 5.6 % Final    eAG 01/24/2023 123  mg/dL Final    Comment: Reference Range  Normal: 4.8-5.6  Diabetic >=6.5%  Normal       <5.7%      Cholesterol, Total 01/24/2023 150  <200 MG/DL Final    Comment: Borderline High: 200-239 mg/dL  High: Greater than or equal to 240 mg/dL      Triglycerides 01/24/2023 175 (A)  35 - 150 MG/DL Final    Comment: Borderline High: 150-199 mg/dL, High: 200-499 mg/dL  Very High: Greater than or equal to 500 mg/dL      HDL 01/24/2023 33 (A)  40 - 60 MG/DL Final    LDL Calculated 01/24/2023 82  <100 MG/DL Final    Comment: Near Optimal: 100-129 mg/dL  Borderline High: 130-159, High: 160-189 mg/dL  Very High: Greater than or equal to 190 mg/dL      VLDL Cholesterol Calculated 01/24/2023 35 (A)  6.0 - 23.0 MG/DL Final    Chol/HDL Ratio 01/24/2023 4.5    Final         Assessent & Plan:        1. Medicare annual wellness visit, subsequent  Overview:  1/26/23    2. Hyperlipidemia, mixed  Overview:  1/24/23 total 150 HDL 33 LDL 82  on diet therapy. Labs were reviewed and discussed with patient. LDL < 100 on diet therapy. The patient will continue the current treatment. 3. Hypertension, essential  Overview:  Well controlled on current maxzide and Toprol XL. The patient developed mild hyponatremia on triamterene HCTZ 75/50 mg daily but has tolerated the 37.5/25 mg daily dose. He does not want to take losartan because he feels it caused his legs to ache. The patient will continue the current treatment. 4. Gastroesophageal reflux disease, unspecified whether esophagitis present  Overview:  Symptoms controlled by Prilosec 20 mg daily. The patient will continue the current treatment.         5. Type 2 diabetes mellitus without complication, without long-term current use of insulin (HCC)  Overview:  1/24/23 fasting glucose 120, A1C 5.9, urine microalbumin  1/20/21 fasting glucose 119, A1C 6.6    Labs were reviewed and discussed with the patient. Continue diet and exercise. Low threshold to add Metformin men should his A1c rise. Orders:  -     Comprehensive Metabolic Panel; Future  -     Hemoglobin A1C; Future  6. Obesity (BMI 30.0-34.9)  Overview:  Reviewed the patient's BMI. Discussed the need to lose weight in order to avoid many preventable illnesses. Discussed diet, exercise, and weight loss strategies. 7. Polyp of colon, unspecified part of colon, unspecified type  Overview:  9/13/22 colonoscopy (Dr. Jurgen Munroe) - two tubulovillous adenomatous polyps removed. Repeat 3 yrs. 8. Prostate cancer ([facility]  Overview:  S/P radiation therapy completed in 2015. Followed by Rody Quintero NP.        9. Encounter for immunization  Overview:  Vaccinations reviewed. Recommended Prevnar 20. The patient and/or patient representative voiced understanding and agreement with the current diagnoses, recommendations, and possible side effects. Return in about 6 months (around 7/26/2023) for follow up of chronic medical problems, review labs. Medicare Annual Wellness Visit    Nicole Gutierrez is here for Medicare AWV and Follow-up    Assessment & Plan   Medicare annual wellness visit, subsequent  Hyperlipidemia, mixed  Hypertension, essential  Gastroesophageal reflux disease, unspecified whether esophagitis present  Type 2 diabetes mellitus without complication, without long-term current use of insulin ([facility]  -     Comprehensive Metabolic Panel; Future  -     Hemoglobin A1C; Future  Obesity (BMI 30.0-34. 9)  Polyp of colon, unspecified part of colon, unspecified type  Prostate cancer ([email or web address]  Encounter for immunization    Recommendations for Preventive Services Due: see orders and patient instructions/AVS.  Recommended screening schedule for the next 5-10 years is provided to the patient in written form: see Patient Instructions/AVS.     Return in about 6 months (around 7/26/2023) for follow up of chronic medical problems, review labs. Subjective       Patient's complete Health Risk Assessment and screening values have been reviewed and are found in Flowsheets. The following problems were reviewed today and where indicated follow up appointments were made and/or referrals ordered. Positive Risk Factor Screenings with Interventions:                 Weight and Activity:  Physical Activity: Insufficiently Active    Days of Exercise per Week: 3 days    Minutes of Exercise per Session: 30 min     On average, how many days per week do you engage in moderate to strenuous exercise (like a brisk walk)?: 3 days  Have you lost any weight without trying in the past 3 months?: No  Body mass index: (!) 33.57  Obesity Interventions:  See AVS for additional education material                       Objective   Vitals:    01/26/23 1422   BP: 138/80   Site: Right Upper Arm   Position: Sitting   Pulse: 79   Resp: 16   Temp: 97.2 °F (36.2 °C)   TempSrc: Temporal   SpO2: 95%   Weight: 220 lb 12.8 oz (100.2 kg)   Height: 5' 8\" (1.727 m)      Body mass index is 33.57 kg/m². No Known Allergies  Prior to Visit Medications    Medication Sig Taking? Authorizing Provider   metoprolol succinate (TOPROL XL) 50 MG extended release tablet Take 1 tablet by mouth in the morning. Yes Renaee Lesches, MD   triamterene-hydroCHLOROthiazide (MAXZIDE-25) 37.5-25 MG per tablet Take 1 tablet by mouth in the morning. Yes Renaee Lesches, MD   omeprazole (PRILOSEC) 20 MG delayed release capsule Take 1 capsule by mouth in the morning. TAKE 1 CAPSULE BY MOUTH DAILY.  Yes Renaee Lesches, MD   azelastine HCl 0.15 % SOLN 1 spray by Nasal route 2 times daily Yes Ar Automatic Reconciliation   Cetirizine HCl 10 MG CAPS Take 1 tablet by mouth daily Yes Ar Automatic Reconciliation sildenafil (VIAGRA) 100 MG tablet Take 100 mg by mouth as needed Yes Ar Automatic Reconciliation       CareTeam (Including outside providers/suppliers regularly involved in providing care):   Patient Care Team:  Gavin Boateng MD as PCP - General  Gavin Boateng MD as PCP - [facility] Empaneled Provider     Reviewed and updated this visit:  Allergies  Meds

## 2023-02-25 PROBLEM — Z00.00 MEDICARE ANNUAL WELLNESS VISIT, SUBSEQUENT: Status: RESOLVED | Noted: 2022-01-31 | Resolved: 2023-02-25

## 2023-03-16 DIAGNOSIS — I10 ESSENTIAL HYPERTENSION: ICD-10-CM

## 2023-03-16 DIAGNOSIS — K21.9 GASTROESOPHAGEAL REFLUX DISEASE, UNSPECIFIED WHETHER ESOPHAGITIS PRESENT: ICD-10-CM

## 2023-03-16 RX ORDER — TRIAMTERENE AND HYDROCHLOROTHIAZIDE 37.5; 25 MG/1; MG/1
1 TABLET ORAL DAILY
Qty: 90 TABLET | Refills: 2 | Status: SHIPPED | OUTPATIENT
Start: 2023-03-16

## 2023-03-16 RX ORDER — OMEPRAZOLE 20 MG/1
20 CAPSULE, DELAYED RELEASE ORAL DAILY
Qty: 90 CAPSULE | Refills: 2 | Status: SHIPPED | OUTPATIENT
Start: 2023-03-16

## 2023-03-16 RX ORDER — METOPROLOL SUCCINATE 50 MG/1
50 TABLET, EXTENDED RELEASE ORAL DAILY
Qty: 90 TABLET | Refills: 2 | Status: SHIPPED | OUTPATIENT
Start: 2023-03-16

## 2023-03-16 NOTE — TELEPHONE ENCOUNTER
Requested Prescriptions     Pending Prescriptions Disp Refills    metoprolol succinate (TOPROL XL) 50 MG extended release tablet 90 tablet 2     Sig: Take 1 tablet by mouth daily    omeprazole (PRILOSEC) 20 MG delayed release capsule 90 capsule 2     Sig: Take 1 capsule by mouth Daily TAKE 1 CAPSULE BY MOUTH DAILY    triamterene-hydroCHLOROthiazide (MAXZIDE-25) 37.5-25 MG per tablet 90 tablet 2     Sig: Take 1 tablet by mouth daily

## 2023-07-27 DIAGNOSIS — E11.9 TYPE 2 DIABETES MELLITUS WITHOUT COMPLICATION, WITHOUT LONG-TERM CURRENT USE OF INSULIN (HCC): ICD-10-CM

## 2023-07-28 LAB
ALBUMIN SERPL-MCNC: 3.9 G/DL (ref 3.2–4.6)
ALBUMIN/GLOB SERPL: 1.1 (ref 0.4–1.6)
ALP SERPL-CCNC: 88 U/L (ref 50–136)
ALT SERPL-CCNC: 47 U/L (ref 12–65)
ANION GAP SERPL CALC-SCNC: 11 MMOL/L (ref 2–11)
AST SERPL-CCNC: 33 U/L (ref 15–37)
BILIRUB SERPL-MCNC: 1.1 MG/DL (ref 0.2–1.1)
BUN SERPL-MCNC: 14 MG/DL (ref 8–23)
CALCIUM SERPL-MCNC: 9 MG/DL (ref 8.3–10.4)
CHLORIDE SERPL-SCNC: 103 MMOL/L (ref 101–110)
CO2 SERPL-SCNC: 25 MMOL/L (ref 21–32)
CREAT SERPL-MCNC: 1.2 MG/DL (ref 0.8–1.5)
EST. AVERAGE GLUCOSE BLD GHB EST-MCNC: 163 MG/DL
GLOBULIN SER CALC-MCNC: 3.4 G/DL (ref 2.8–4.5)
GLUCOSE SERPL-MCNC: 138 MG/DL (ref 65–100)
HBA1C MFR BLD: 7.3 % (ref 4.8–5.6)
POTASSIUM SERPL-SCNC: 4.1 MMOL/L (ref 3.5–5.1)
PROT SERPL-MCNC: 7.3 G/DL (ref 6.3–8.2)
SODIUM SERPL-SCNC: 139 MMOL/L (ref 133–143)

## 2023-08-09 ENCOUNTER — OFFICE VISIT (OUTPATIENT)
Dept: INTERNAL MEDICINE CLINIC | Facility: CLINIC | Age: 83
End: 2023-08-09
Payer: MEDICARE

## 2023-08-09 VITALS
HEIGHT: 68 IN | HEART RATE: 74 BPM | BODY MASS INDEX: 33.16 KG/M2 | WEIGHT: 218.8 LBS | DIASTOLIC BLOOD PRESSURE: 70 MMHG | SYSTOLIC BLOOD PRESSURE: 130 MMHG

## 2023-08-09 DIAGNOSIS — I10 HYPERTENSION, ESSENTIAL: Primary | ICD-10-CM

## 2023-08-09 DIAGNOSIS — J30.1 SEASONAL ALLERGIC RHINITIS DUE TO POLLEN: ICD-10-CM

## 2023-08-09 DIAGNOSIS — C61 PROSTATE CANCER (HCC): ICD-10-CM

## 2023-08-09 DIAGNOSIS — E78.2 HYPERLIPIDEMIA, MIXED: ICD-10-CM

## 2023-08-09 DIAGNOSIS — E11.9 TYPE 2 DIABETES MELLITUS WITHOUT COMPLICATION, WITHOUT LONG-TERM CURRENT USE OF INSULIN (HCC): ICD-10-CM

## 2023-08-09 DIAGNOSIS — K21.9 GASTROESOPHAGEAL REFLUX DISEASE, UNSPECIFIED WHETHER ESOPHAGITIS PRESENT: ICD-10-CM

## 2023-08-09 DIAGNOSIS — I10 ESSENTIAL HYPERTENSION: ICD-10-CM

## 2023-08-09 PROCEDURE — 1123F ACP DISCUSS/DSCN MKR DOCD: CPT | Performed by: INTERNAL MEDICINE

## 2023-08-09 PROCEDURE — 1036F TOBACCO NON-USER: CPT | Performed by: INTERNAL MEDICINE

## 2023-08-09 PROCEDURE — 99214 OFFICE O/P EST MOD 30 MIN: CPT | Performed by: INTERNAL MEDICINE

## 2023-08-09 PROCEDURE — G8417 CALC BMI ABV UP PARAM F/U: HCPCS | Performed by: INTERNAL MEDICINE

## 2023-08-09 PROCEDURE — G8428 CUR MEDS NOT DOCUMENT: HCPCS | Performed by: INTERNAL MEDICINE

## 2023-08-09 PROCEDURE — 3051F HG A1C>EQUAL 7.0%<8.0%: CPT | Performed by: INTERNAL MEDICINE

## 2023-08-09 PROCEDURE — 3078F DIAST BP <80 MM HG: CPT | Performed by: INTERNAL MEDICINE

## 2023-08-09 PROCEDURE — 3075F SYST BP GE 130 - 139MM HG: CPT | Performed by: INTERNAL MEDICINE

## 2023-08-09 RX ORDER — METOPROLOL SUCCINATE 50 MG/1
50 TABLET, EXTENDED RELEASE ORAL DAILY
Qty: 90 TABLET | Refills: 2 | Status: SHIPPED | OUTPATIENT
Start: 2023-08-09

## 2023-08-09 RX ORDER — GLUCOSAMINE HCL/CHONDROITIN SU 500-400 MG
CAPSULE ORAL
Qty: 100 STRIP | Refills: 2 | Status: SHIPPED | OUTPATIENT
Start: 2023-08-09 | End: 2023-08-09 | Stop reason: SDUPTHER

## 2023-08-09 RX ORDER — GLUCOSAMINE HCL/CHONDROITIN SU 500-400 MG
CAPSULE ORAL
Qty: 100 STRIP | Refills: 2 | Status: SHIPPED | OUTPATIENT
Start: 2023-08-09

## 2023-08-09 RX ORDER — OMEPRAZOLE 20 MG/1
20 CAPSULE, DELAYED RELEASE ORAL DAILY
Qty: 90 CAPSULE | Refills: 2 | Status: SHIPPED | OUTPATIENT
Start: 2023-08-09

## 2023-08-09 RX ORDER — TRIAMTERENE AND HYDROCHLOROTHIAZIDE 37.5; 25 MG/1; MG/1
1 TABLET ORAL DAILY
Qty: 90 TABLET | Refills: 2 | Status: SHIPPED | OUTPATIENT
Start: 2023-08-09

## 2023-08-09 ASSESSMENT — ENCOUNTER SYMPTOMS
STRIDOR: 0
CHOKING: 0
EYE PAIN: 0
RECTAL PAIN: 0
VOICE CHANGE: 0

## 2023-08-09 NOTE — TELEPHONE ENCOUNTER
Pharmacy called and they are needing new Rx for Medicare B to pay for test strips. They would not cover due to stating test daily and PRN. Rx corrected.   Requested Prescriptions     Pending Prescriptions Disp Refills    blood glucose monitor strips 100 strip 2     Sig: Use to test blood sugar once daily  Dx: E11.9

## 2023-08-09 NOTE — PROGRESS NOTES
FOLLOWUP VISIT    Subjective:    Mr. Portia Conley is a 82 y.o., male,   Chief Complaint   Patient presents with    6 Month Follow-Up       HPI:    Patient presents today for follow up of two or more chronic medical problems and review of labs. The patient has diabetes mellitus. The patient denies any symptoms of hypo or hyperglycemia. The patient has been attempting to be compliant with an ADA diet and an exercise program.     The patient has GERD. The patient has been on attempted therapeutic lifestyle change including smaller more frequent meals and avoiding caffeine. The patient states that the GERD symptoms have been well controlled on current treatment and denies any dysphagia. The patient has hyperlipidemia. The patient has been following a low cholesterol diet. The patient has hypertension. The patient has been on an attempted low sodium diet and has been trying to exercise and maintain a healthy weight. The patient reports good compliance with the blood pressure medications.        The following portions of the patient's history were reviewed and updated as appropriate:      Past Medical History:   Diagnosis Date    BPH (benign prostatic hyperplasia)     Colon polyps     Diabetes mellitus, type 2 ([address])     Dyspnea     9/9/15 exercise nuclear stress test to 9.7 METS/87% MHR normal.      ED (erectile dysfunction) of organic origin     GERD (gastroesophageal reflux disease)     Hypertension     Obesity     Prostate cancer ([address]) 2015    S/P radiation therapy completed 2015       Past Surgical History:   Procedure Laterality Date    APPENDECTOMY      COLONOSCOPY  2012    No Polyps    OTHER SURGICAL HISTORY Left 1/10/2015    left cheek SCC removeal with DOMINIC procedure       Family History   Problem Relation Age of Onset    Heart Disease Mother     Hypertension Father     Heart Disease Father     High Cholesterol Sister     High Cholesterol Father     Hypertension Mother     High Cholesterol Mother

## 2024-02-05 DIAGNOSIS — E78.2 HYPERLIPIDEMIA, MIXED: ICD-10-CM

## 2024-02-05 DIAGNOSIS — I10 HYPERTENSION, ESSENTIAL: ICD-10-CM

## 2024-02-05 DIAGNOSIS — E11.9 TYPE 2 DIABETES MELLITUS WITHOUT COMPLICATION, WITHOUT LONG-TERM CURRENT USE OF INSULIN (HCC): ICD-10-CM

## 2024-02-05 LAB
ALBUMIN SERPL-MCNC: 4 G/DL (ref 3.2–4.6)
ALBUMIN/GLOB SERPL: 1.1 (ref 0.4–1.6)
ALP SERPL-CCNC: 93 U/L (ref 50–136)
ALT SERPL-CCNC: 37 U/L (ref 12–65)
ANION GAP SERPL CALC-SCNC: 4 MMOL/L (ref 2–11)
AST SERPL-CCNC: 23 U/L (ref 15–37)
BASOPHILS # BLD: 0.1 K/UL (ref 0–0.2)
BASOPHILS NFR BLD: 1 % (ref 0–2)
BILIRUB SERPL-MCNC: 1.1 MG/DL (ref 0.2–1.1)
BUN SERPL-MCNC: 12 MG/DL (ref 8–23)
CALCIUM SERPL-MCNC: 9.2 MG/DL (ref 8.3–10.4)
CHLORIDE SERPL-SCNC: 101 MMOL/L (ref 103–113)
CHOLEST SERPL-MCNC: 169 MG/DL
CO2 SERPL-SCNC: 29 MMOL/L (ref 21–32)
CREAT SERPL-MCNC: 1 MG/DL (ref 0.8–1.5)
CREAT UR-MCNC: 158 MG/DL
DIFFERENTIAL METHOD BLD: NORMAL
EOSINOPHIL # BLD: 0.3 K/UL (ref 0–0.8)
EOSINOPHIL NFR BLD: 4 % (ref 0.5–7.8)
ERYTHROCYTE [DISTWIDTH] IN BLOOD BY AUTOMATED COUNT: 13.1 % (ref 11.9–14.6)
GLOBULIN SER CALC-MCNC: 3.7 G/DL (ref 2.8–4.5)
GLUCOSE SERPL-MCNC: 141 MG/DL (ref 65–100)
HCT VFR BLD AUTO: 48.5 % (ref 41.1–50.3)
HDLC SERPL-MCNC: 40 MG/DL (ref 40–60)
HDLC SERPL: 4.2
HGB BLD-MCNC: 16.4 G/DL (ref 13.6–17.2)
IMM GRANULOCYTES # BLD AUTO: 0 K/UL (ref 0–0.5)
IMM GRANULOCYTES NFR BLD AUTO: 0 % (ref 0–5)
LDLC SERPL CALC-MCNC: 97.8 MG/DL
LYMPHOCYTES # BLD: 1.6 K/UL (ref 0.5–4.6)
LYMPHOCYTES NFR BLD: 21 % (ref 13–44)
MCH RBC QN AUTO: 32.2 PG (ref 26.1–32.9)
MCHC RBC AUTO-ENTMCNC: 33.8 G/DL (ref 31.4–35)
MCV RBC AUTO: 95.3 FL (ref 82–102)
MICROALBUMIN UR-MCNC: 1.33 MG/DL
MICROALBUMIN/CREAT UR-RTO: 8 MG/G (ref 0–30)
MONOCYTES # BLD: 0.9 K/UL (ref 0.1–1.3)
MONOCYTES NFR BLD: 12 % (ref 4–12)
NEUTS SEG # BLD: 4.6 K/UL (ref 1.7–8.2)
NEUTS SEG NFR BLD: 62 % (ref 43–78)
NRBC # BLD: 0 K/UL (ref 0–0.2)
PLATELET # BLD AUTO: 190 K/UL (ref 150–450)
PMV BLD AUTO: 11 FL (ref 9.4–12.3)
POTASSIUM SERPL-SCNC: 4.2 MMOL/L (ref 3.5–5.1)
PROT SERPL-MCNC: 7.7 G/DL (ref 6.3–8.2)
RBC # BLD AUTO: 5.09 M/UL (ref 4.23–5.6)
SODIUM SERPL-SCNC: 134 MMOL/L (ref 136–146)
TRIGL SERPL-MCNC: 156 MG/DL (ref 35–150)
VLDLC SERPL CALC-MCNC: 31.2 MG/DL (ref 6–23)
WBC # BLD AUTO: 7.4 K/UL (ref 4.3–11.1)

## 2024-02-06 LAB
EST. AVERAGE GLUCOSE BLD GHB EST-MCNC: 171 MG/DL
HBA1C MFR BLD: 7.6 % (ref 4.8–5.6)

## 2024-02-09 ENCOUNTER — OFFICE VISIT (OUTPATIENT)
Dept: INTERNAL MEDICINE CLINIC | Facility: CLINIC | Age: 84
End: 2024-02-09

## 2024-02-09 VITALS
HEIGHT: 68 IN | DIASTOLIC BLOOD PRESSURE: 70 MMHG | SYSTOLIC BLOOD PRESSURE: 130 MMHG | HEART RATE: 80 BPM | BODY MASS INDEX: 33.77 KG/M2 | WEIGHT: 222.8 LBS

## 2024-02-09 DIAGNOSIS — Z00.00 MEDICARE ANNUAL WELLNESS VISIT, SUBSEQUENT: Primary | ICD-10-CM

## 2024-02-09 DIAGNOSIS — K21.9 GASTROESOPHAGEAL REFLUX DISEASE, UNSPECIFIED WHETHER ESOPHAGITIS PRESENT: ICD-10-CM

## 2024-02-09 DIAGNOSIS — I10 ESSENTIAL HYPERTENSION: ICD-10-CM

## 2024-02-09 DIAGNOSIS — C61 PROSTATE CANCER (HCC): ICD-10-CM

## 2024-02-09 DIAGNOSIS — E66.9 OBESITY (BMI 30.0-34.9): ICD-10-CM

## 2024-02-09 DIAGNOSIS — E11.9 TYPE 2 DIABETES MELLITUS WITHOUT COMPLICATION, WITHOUT LONG-TERM CURRENT USE OF INSULIN (HCC): ICD-10-CM

## 2024-02-09 DIAGNOSIS — E78.2 HYPERLIPIDEMIA, MIXED: ICD-10-CM

## 2024-02-09 DIAGNOSIS — I10 HYPERTENSION, ESSENTIAL: ICD-10-CM

## 2024-02-09 RX ORDER — METOPROLOL SUCCINATE 50 MG/1
50 TABLET, EXTENDED RELEASE ORAL DAILY
Qty: 90 TABLET | Refills: 2 | Status: SHIPPED | OUTPATIENT
Start: 2024-02-09

## 2024-02-09 RX ORDER — SILDENAFIL 100 MG/1
100 TABLET, FILM COATED ORAL PRN
Qty: 30 TABLET | Refills: 0 | Status: SHIPPED | OUTPATIENT
Start: 2024-02-09

## 2024-02-09 RX ORDER — TRIAMTERENE AND HYDROCHLOROTHIAZIDE 37.5; 25 MG/1; MG/1
1 TABLET ORAL DAILY
Qty: 90 TABLET | Refills: 2 | Status: SHIPPED | OUTPATIENT
Start: 2024-02-09

## 2024-02-09 RX ORDER — OMEPRAZOLE 20 MG/1
20 CAPSULE, DELAYED RELEASE ORAL DAILY
Qty: 90 CAPSULE | Refills: 2 | Status: SHIPPED | OUTPATIENT
Start: 2024-02-09

## 2024-02-09 ASSESSMENT — ENCOUNTER SYMPTOMS
CHOKING: 0
STRIDOR: 0
VOICE CHANGE: 0
EYE PAIN: 0
RECTAL PAIN: 0

## 2024-02-09 ASSESSMENT — PATIENT HEALTH QUESTIONNAIRE - PHQ9
2. FEELING DOWN, DEPRESSED OR HOPELESS: 0
SUM OF ALL RESPONSES TO PHQ QUESTIONS 1-9: 0
SUM OF ALL RESPONSES TO PHQ QUESTIONS 1-9: 0
SUM OF ALL RESPONSES TO PHQ9 QUESTIONS 1 & 2: 0
1. LITTLE INTEREST OR PLEASURE IN DOING THINGS: 0
SUM OF ALL RESPONSES TO PHQ QUESTIONS 1-9: 0
SUM OF ALL RESPONSES TO PHQ QUESTIONS 1-9: 0

## 2024-02-09 NOTE — PROGRESS NOTES
FOLLOWUP VISIT    Subjective:    Mr. Read is a 83 y.o., male,   Chief Complaint   Patient presents with    Medicare AWV    6 Month Follow-Up       HPI:    Patient presents today for follow up of two or more chronic medical problems and review of labs.       The patient is also here for the annual Medicare wellness visit.     The patient has diabetes mellitus.  The patient denies any symptoms of hypo or hyperglycemia.  The patient has been attempting to be compliant with an ADA diet and an exercise program.     The patient has hyperlipidemia.  The patient has been following a low cholesterol diet.      The patient has hypertension.  The patient has been on an attempted low sodium diet and has been trying to exercise and maintain a healthy weight.  The patient reports good compliance with the blood pressure medications.       The patient has GERD.  The patient has been on attempted therapeutic lifestyle change including smaller more frequent meals and avoiding caffeine.  The patient states that the GERD symptoms have been well controlled on current treatment and denies any dysphagia.       The following portions of the patient's history were reviewed and updated as appropriate:      Past Medical History:   Diagnosis Date    BPH (benign prostatic hyperplasia)     Colon polyps     Diabetes mellitus, type 2 ([facility])     Dyspnea     9/9/15 exercise nuclear stress test to 9.7 METS/87% MHR normal.      ED (erectile dysfunction) of organic origin     GERD (gastroesophageal reflux disease)     Hypertension     Obesity     Prostate cancer ([facility]) 2015    S/P radiation therapy completed 2015       Past Surgical History:   Procedure Laterality Date    APPENDECTOMY      COLONOSCOPY  2012    No Polyps    OTHER SURGICAL HISTORY Left 1/10/2015    left cheek SCC removeal with DOMINIC procedure       Family History   Problem Relation Age of Onset    Heart Disease Mother     Hypertension Father     Heart Disease Father     High Cholesterol

## 2024-03-10 PROBLEM — Z00.00 MEDICARE ANNUAL WELLNESS VISIT, SUBSEQUENT: Status: RESOLVED | Noted: 2022-01-31 | Resolved: 2024-03-10

## 2024-08-02 DIAGNOSIS — E11.9 TYPE 2 DIABETES MELLITUS WITHOUT COMPLICATION, WITHOUT LONG-TERM CURRENT USE OF INSULIN (HCC): Primary | ICD-10-CM

## 2024-08-13 DIAGNOSIS — E11.9 TYPE 2 DIABETES MELLITUS WITHOUT COMPLICATION, WITHOUT LONG-TERM CURRENT USE OF INSULIN (HCC): ICD-10-CM

## 2024-08-13 LAB
ANION GAP SERPL CALC-SCNC: 11 MMOL/L (ref 9–18)
BUN SERPL-MCNC: 15 MG/DL (ref 8–23)
CALCIUM SERPL-MCNC: 9.9 MG/DL (ref 8.8–10.2)
CHLORIDE SERPL-SCNC: 97 MMOL/L (ref 98–107)
CO2 SERPL-SCNC: 27 MMOL/L (ref 20–28)
CREAT SERPL-MCNC: 0.89 MG/DL (ref 0.8–1.3)
EST. AVERAGE GLUCOSE BLD GHB EST-MCNC: 189 MG/DL
GLUCOSE SERPL-MCNC: 145 MG/DL (ref 70–99)
HBA1C MFR BLD: 8.2 % (ref 0–5.6)
POTASSIUM SERPL-SCNC: 4.3 MMOL/L (ref 3.5–5.1)
SODIUM SERPL-SCNC: 134 MMOL/L (ref 136–145)

## 2024-08-16 ENCOUNTER — OFFICE VISIT (OUTPATIENT)
Dept: INTERNAL MEDICINE CLINIC | Facility: CLINIC | Age: 84
End: 2024-08-16

## 2024-08-16 VITALS
WEIGHT: 217.8 LBS | HEART RATE: 73 BPM | HEIGHT: 68 IN | DIASTOLIC BLOOD PRESSURE: 60 MMHG | SYSTOLIC BLOOD PRESSURE: 120 MMHG | BODY MASS INDEX: 33.01 KG/M2

## 2024-08-16 DIAGNOSIS — E11.9 TYPE 2 DIABETES MELLITUS WITHOUT COMPLICATION, WITHOUT LONG-TERM CURRENT USE OF INSULIN (HCC): Primary | ICD-10-CM

## 2024-08-16 DIAGNOSIS — I10 HYPERTENSION, ESSENTIAL: ICD-10-CM

## 2024-08-16 DIAGNOSIS — K21.9 GASTROESOPHAGEAL REFLUX DISEASE, UNSPECIFIED WHETHER ESOPHAGITIS PRESENT: ICD-10-CM

## 2024-08-16 DIAGNOSIS — E78.2 HYPERLIPIDEMIA, MIXED: ICD-10-CM

## 2024-08-16 RX ORDER — METFORMIN HYDROCHLORIDE 500 MG/1
500 TABLET, EXTENDED RELEASE ORAL
Qty: 90 TABLET | Refills: 1 | Status: SHIPPED | OUTPATIENT
Start: 2024-08-16

## 2024-08-16 SDOH — ECONOMIC STABILITY: FOOD INSECURITY: WITHIN THE PAST 12 MONTHS, YOU WORRIED THAT YOUR FOOD WOULD RUN OUT BEFORE YOU GOT MONEY TO BUY MORE.: NEVER TRUE

## 2024-08-16 SDOH — ECONOMIC STABILITY: FOOD INSECURITY: WITHIN THE PAST 12 MONTHS, THE FOOD YOU BOUGHT JUST DIDN'T LAST AND YOU DIDN'T HAVE MONEY TO GET MORE.: NEVER TRUE

## 2024-08-16 SDOH — ECONOMIC STABILITY: INCOME INSECURITY: HOW HARD IS IT FOR YOU TO PAY FOR THE VERY BASICS LIKE FOOD, HOUSING, MEDICAL CARE, AND HEATING?: NOT HARD AT ALL

## 2024-08-16 ASSESSMENT — ENCOUNTER SYMPTOMS
VOICE CHANGE: 0
EYE PAIN: 0
RECTAL PAIN: 0
STRIDOR: 0
CHOKING: 0

## 2024-08-16 NOTE — PROGRESS NOTES
FOLLOWUP VISIT    Subjective:    Mr. Read is a 84 y.o., male,   Chief Complaint   Patient presents with    6 Month Follow-Up       HPI:    Patient presents today for follow up of two or more chronic medical problems and review of labs.       The patient has hypertension.  The patient has been on an attempted low sodium diet and has been trying to exercise and maintain a healthy weight.  The patient reports good compliance with the blood pressure medications.       The patient has GERD.  The patient has been on attempted therapeutic lifestyle change including smaller more frequent meals and avoiding caffeine.  The patient states that the GERD symptoms have been well controlled on current treatment and denies any dysphagia.       The patient has diabetes mellitus.  The patient denies any symptoms of hypo or hyperglycemia.  The patient has been attempting to be compliant with an ADA diet and an exercise program.     The following portions of the patient's history were reviewed and updated as appropriate:      Past Medical History:   Diagnosis Date    BPH (benign prostatic hyperplasia)     Colon polyps     Diabetes mellitus, type 2 (HCC)     Dyspnea     9/9/15 exercise nuclear stress test to 9.7 METS/87% MHR normal.      ED (erectile dysfunction) of organic origin     GERD (gastroesophageal reflux disease)     Hypertension     Obesity     Prostate cancer (HCC) 2015    S/P radiation therapy completed 2015       Past Surgical History:   Procedure Laterality Date    APPENDECTOMY      COLONOSCOPY  2012    No Polyps    OTHER SURGICAL HISTORY Left 1/10/2015    left cheek SCC removeal with DOMINIC procedure       Family History   Problem Relation Age of Onset    Heart Disease Mother     Hypertension Father     Heart Disease Father     High Cholesterol Sister     High Cholesterol Father     Hypertension Mother     High Cholesterol Mother        Social History     Socioeconomic History    Marital status:      Spouse name:

## 2024-11-12 DIAGNOSIS — E11.9 TYPE 2 DIABETES MELLITUS WITHOUT COMPLICATION, WITHOUT LONG-TERM CURRENT USE OF INSULIN (HCC): ICD-10-CM

## 2024-11-12 LAB
ANION GAP SERPL CALC-SCNC: 13 MMOL/L (ref 7–16)
BUN SERPL-MCNC: 13 MG/DL (ref 8–23)
CALCIUM SERPL-MCNC: 8.8 MG/DL (ref 8.8–10.2)
CHLORIDE SERPL-SCNC: 97 MMOL/L (ref 98–107)
CO2 SERPL-SCNC: 23 MMOL/L (ref 20–29)
CREAT SERPL-MCNC: 0.91 MG/DL (ref 0.8–1.3)
EST. AVERAGE GLUCOSE BLD GHB EST-MCNC: 175 MG/DL
GLUCOSE SERPL-MCNC: 134 MG/DL (ref 70–99)
HBA1C MFR BLD: 7.7 % (ref 0–5.6)
POTASSIUM SERPL-SCNC: 4.1 MMOL/L (ref 3.5–5.1)
SODIUM SERPL-SCNC: 132 MMOL/L (ref 136–145)

## 2024-11-19 ENCOUNTER — OFFICE VISIT (OUTPATIENT)
Dept: INTERNAL MEDICINE CLINIC | Facility: CLINIC | Age: 84
End: 2024-11-19

## 2024-11-19 VITALS
HEART RATE: 87 BPM | DIASTOLIC BLOOD PRESSURE: 70 MMHG | WEIGHT: 213.2 LBS | SYSTOLIC BLOOD PRESSURE: 130 MMHG | BODY MASS INDEX: 32.31 KG/M2 | HEIGHT: 68 IN

## 2024-11-19 DIAGNOSIS — E78.2 HYPERLIPIDEMIA, MIXED: ICD-10-CM

## 2024-11-19 DIAGNOSIS — E87.1 HYPONATREMIA: Primary | ICD-10-CM

## 2024-11-19 DIAGNOSIS — I10 HYPERTENSION, ESSENTIAL: ICD-10-CM

## 2024-11-19 DIAGNOSIS — C61 PROSTATE CANCER (HCC): ICD-10-CM

## 2024-11-19 DIAGNOSIS — I10 ESSENTIAL HYPERTENSION: ICD-10-CM

## 2024-11-19 DIAGNOSIS — E11.9 TYPE 2 DIABETES MELLITUS WITHOUT COMPLICATION, WITHOUT LONG-TERM CURRENT USE OF INSULIN (HCC): ICD-10-CM

## 2024-11-19 RX ORDER — TRIAMTERENE AND HYDROCHLOROTHIAZIDE 37.5; 25 MG/1; MG/1
1 TABLET ORAL DAILY
Qty: 90 TABLET | Refills: 2 | Status: SHIPPED | OUTPATIENT
Start: 2024-11-19

## 2024-11-19 RX ORDER — METOPROLOL SUCCINATE 50 MG/1
50 TABLET, EXTENDED RELEASE ORAL DAILY
Qty: 90 TABLET | Refills: 2 | Status: SHIPPED | OUTPATIENT
Start: 2024-11-19

## 2024-11-19 ASSESSMENT — ENCOUNTER SYMPTOMS
RECTAL PAIN: 0
STRIDOR: 0
VOICE CHANGE: 0
CHOKING: 0
EYE PAIN: 0

## 2024-11-19 NOTE — PROGRESS NOTES
FOLLOWUP VISIT    Subjective:    Mr. Read is a 84 y.o., male,   Chief Complaint   Patient presents with    3 Month Follow-Up       HPI:    Patient presents today for follow up of two or more chronic medical problems and review of labs.       The patient has diabetes mellitus.  The patient denies any symptoms of hypo or hyperglycemia.  The patient has been attempting to be compliant with an ADA diet and an exercise program.     The patient has hyperlipidemia.  The patient has been attempting to follow a low cholesterol diet and denies any myalgias or weakness on current lipid lowering therapy.       The patient has hypertension.  The patient has been on an attempted low sodium diet and has been trying to exercise and maintain a healthy weight.  The patient reports good compliance with the blood pressure medications.       The patient has GERD.  The patient has been on attempted therapeutic lifestyle change including smaller more frequent meals and avoiding caffeine.  The patient states that the GERD symptoms have been well controlled on current treatment and denies any dysphagia.       Interval history  and review of symptoms otherwise unchanged.       The following portions of the patient's history were reviewed and updated as appropriate:      Past Medical History:   Diagnosis Date    BPH (benign prostatic hyperplasia)     Colon polyps     Diabetes mellitus, type 2 (HCC)     Dyspnea     9/9/15 exercise nuclear stress test to 9.7 METS/87% MHR normal.      ED (erectile dysfunction) of organic origin     GERD (gastroesophageal reflux disease)     Hypertension     Obesity     Prostate cancer (HCC) 2015    S/P radiation therapy completed 2015       Past Surgical History:   Procedure Laterality Date    APPENDECTOMY      COLONOSCOPY  2012    No Polyps    OTHER SURGICAL HISTORY Left 1/10/2015    left cheek SCC removeal with DOMINIC procedure       Family History   Problem Relation Age of Onset    Heart Disease Mother

## 2025-02-24 DIAGNOSIS — E11.9 TYPE 2 DIABETES MELLITUS WITHOUT COMPLICATION, WITHOUT LONG-TERM CURRENT USE OF INSULIN (HCC): ICD-10-CM

## 2025-02-24 RX ORDER — METFORMIN HYDROCHLORIDE 500 MG/1
500 TABLET, EXTENDED RELEASE ORAL DAILY
Qty: 90 TABLET | Refills: 1 | Status: SHIPPED | OUTPATIENT
Start: 2025-02-24

## 2025-02-24 NOTE — TELEPHONE ENCOUNTER
Requested Prescriptions     Pending Prescriptions Disp Refills    metFORMIN (GLUCOPHAGE-XR) 500 MG extended release tablet [Pharmacy Med Name: METFORMIN ER 500MG 24HR TABS] 90 tablet 1     Sig: TAKE 1 TABLET BY MOUTH DAILY WITH BREAKFAST     Dose verified and to [facility]. Patient is scheduled for follow up visit.

## 2025-05-16 DIAGNOSIS — C61 PROSTATE CANCER (HCC): ICD-10-CM

## 2025-05-16 DIAGNOSIS — E87.1 HYPONATREMIA: ICD-10-CM

## 2025-05-16 DIAGNOSIS — E78.2 HYPERLIPIDEMIA, MIXED: ICD-10-CM

## 2025-05-16 DIAGNOSIS — E11.9 TYPE 2 DIABETES MELLITUS WITHOUT COMPLICATION, WITHOUT LONG-TERM CURRENT USE OF INSULIN (HCC): ICD-10-CM

## 2025-05-16 LAB
ALT SERPL-CCNC: 38 U/L (ref 8–55)
ANION GAP SERPL CALC-SCNC: 11 MMOL/L (ref 7–16)
AST SERPL-CCNC: 29 U/L (ref 15–37)
BUN SERPL-MCNC: 13 MG/DL (ref 8–23)
CALCIUM SERPL-MCNC: 9.5 MG/DL (ref 8.8–10.2)
CHLORIDE SERPL-SCNC: 96 MMOL/L (ref 98–107)
CHOLEST SERPL-MCNC: 182 MG/DL (ref 0–200)
CO2 SERPL-SCNC: 27 MMOL/L (ref 20–29)
CREAT SERPL-MCNC: 1.04 MG/DL (ref 0.8–1.3)
CREAT UR-MCNC: 150 MG/DL (ref 39–259)
EST. AVERAGE GLUCOSE BLD GHB EST-MCNC: 180 MG/DL
GLUCOSE SERPL-MCNC: 138 MG/DL (ref 70–99)
HBA1C MFR BLD: 7.9 % (ref 0–5.6)
HDLC SERPL-MCNC: 37 MG/DL (ref 40–60)
HDLC SERPL: 5 (ref 0–5)
LDLC SERPL CALC-MCNC: 108 MG/DL (ref 0–100)
MICROALBUMIN UR-MCNC: <1.2 MG/DL (ref 0–20)
MICROALBUMIN/CREAT UR-RTO: NORMAL MG/G (ref 0–30)
POTASSIUM SERPL-SCNC: 4.5 MMOL/L (ref 3.5–5.1)
PSA SERPL-MCNC: <0.1 NG/ML (ref 0–4)
SODIUM SERPL-SCNC: 134 MMOL/L (ref 136–145)
TRIGL SERPL-MCNC: 190 MG/DL (ref 0–150)
TSH, 3RD GENERATION: 7.44 UIU/ML (ref 0.27–4.2)
VLDLC SERPL CALC-MCNC: 38 MG/DL (ref 6–23)

## 2025-05-20 ENCOUNTER — OFFICE VISIT (OUTPATIENT)
Dept: INTERNAL MEDICINE CLINIC | Facility: CLINIC | Age: 85
End: 2025-05-20
Payer: MEDICARE

## 2025-05-20 VITALS
SYSTOLIC BLOOD PRESSURE: 136 MMHG | HEART RATE: 76 BPM | HEIGHT: 68 IN | OXYGEN SATURATION: 96 % | DIASTOLIC BLOOD PRESSURE: 88 MMHG | WEIGHT: 217 LBS | BODY MASS INDEX: 32.89 KG/M2

## 2025-05-20 DIAGNOSIS — E87.1 HYPONATREMIA: ICD-10-CM

## 2025-05-20 DIAGNOSIS — E08.65 DIABETES MELLITUS DUE TO UNDERLYING CONDITION WITH HYPERGLYCEMIA, WITHOUT LONG-TERM CURRENT USE OF INSULIN (HCC): ICD-10-CM

## 2025-05-20 DIAGNOSIS — K21.9 GASTROESOPHAGEAL REFLUX DISEASE, UNSPECIFIED WHETHER ESOPHAGITIS PRESENT: ICD-10-CM

## 2025-05-20 DIAGNOSIS — Z00.00 MEDICARE ANNUAL WELLNESS VISIT, SUBSEQUENT: Primary | Chronic | ICD-10-CM

## 2025-05-20 DIAGNOSIS — E11.9 TYPE 2 DIABETES MELLITUS WITHOUT COMPLICATION, WITHOUT LONG-TERM CURRENT USE OF INSULIN (HCC): ICD-10-CM

## 2025-05-20 DIAGNOSIS — E78.2 HYPERLIPIDEMIA, MIXED: ICD-10-CM

## 2025-05-20 DIAGNOSIS — R79.89 ELEVATED TSH: ICD-10-CM

## 2025-05-20 DIAGNOSIS — C61 PROSTATE CANCER (HCC): ICD-10-CM

## 2025-05-20 DIAGNOSIS — I10 HYPERTENSION, ESSENTIAL: ICD-10-CM

## 2025-05-20 DIAGNOSIS — K63.5 POLYP OF COLON, UNSPECIFIED PART OF COLON, UNSPECIFIED TYPE: ICD-10-CM

## 2025-05-20 DIAGNOSIS — N52.9 ED (ERECTILE DYSFUNCTION) OF ORGANIC ORIGIN: ICD-10-CM

## 2025-05-20 DIAGNOSIS — E66.811 OBESITY (BMI 30.0-34.9): ICD-10-CM

## 2025-05-20 PROCEDURE — G8428 CUR MEDS NOT DOCUMENT: HCPCS | Performed by: INTERNAL MEDICINE

## 2025-05-20 PROCEDURE — 1036F TOBACCO NON-USER: CPT | Performed by: INTERNAL MEDICINE

## 2025-05-20 PROCEDURE — 3079F DIAST BP 80-89 MM HG: CPT | Performed by: INTERNAL MEDICINE

## 2025-05-20 PROCEDURE — G0439 PPPS, SUBSEQ VISIT: HCPCS | Performed by: INTERNAL MEDICINE

## 2025-05-20 PROCEDURE — G8417 CALC BMI ABV UP PARAM F/U: HCPCS | Performed by: INTERNAL MEDICINE

## 2025-05-20 PROCEDURE — 3075F SYST BP GE 130 - 139MM HG: CPT | Performed by: INTERNAL MEDICINE

## 2025-05-20 PROCEDURE — 1126F AMNT PAIN NOTED NONE PRSNT: CPT | Performed by: INTERNAL MEDICINE

## 2025-05-20 PROCEDURE — 3051F HG A1C>EQUAL 7.0%<8.0%: CPT | Performed by: INTERNAL MEDICINE

## 2025-05-20 PROCEDURE — G2211 COMPLEX E/M VISIT ADD ON: HCPCS | Performed by: INTERNAL MEDICINE

## 2025-05-20 PROCEDURE — 1123F ACP DISCUSS/DSCN MKR DOCD: CPT | Performed by: INTERNAL MEDICINE

## 2025-05-20 PROCEDURE — 99214 OFFICE O/P EST MOD 30 MIN: CPT | Performed by: INTERNAL MEDICINE

## 2025-05-20 SDOH — ECONOMIC STABILITY: FOOD INSECURITY: WITHIN THE PAST 12 MONTHS, THE FOOD YOU BOUGHT JUST DIDN'T LAST AND YOU DIDN'T HAVE MONEY TO GET MORE.: NEVER TRUE

## 2025-05-20 SDOH — ECONOMIC STABILITY: FOOD INSECURITY: WITHIN THE PAST 12 MONTHS, YOU WORRIED THAT YOUR FOOD WOULD RUN OUT BEFORE YOU GOT MONEY TO BUY MORE.: NEVER TRUE

## 2025-05-20 ASSESSMENT — PATIENT HEALTH QUESTIONNAIRE - PHQ9
1. LITTLE INTEREST OR PLEASURE IN DOING THINGS: NOT AT ALL
SUM OF ALL RESPONSES TO PHQ QUESTIONS 1-9: 0
2. FEELING DOWN, DEPRESSED OR HOPELESS: NOT AT ALL
SUM OF ALL RESPONSES TO PHQ QUESTIONS 1-9: 0

## 2025-05-20 NOTE — PROGRESS NOTES
Hypertension Father    • Heart Disease Father    • High Cholesterol Sister    • High Cholesterol Father    • Hypertension Mother    • High Cholesterol Mother        Social History     Socioeconomic History   • Marital status:      Spouse name: Not on file   • Number of children: Not on file   • Years of education: Not on file   • Highest education level: Not on file   Occupational History   • Not on file   Tobacco Use   • Smoking status: Former     Current packs/day: 0.00     Types: Cigarettes     Quit date: 10/26/1980     Years since quittin.5   • Smokeless tobacco: Never   Substance and Sexual Activity   • Alcohol use: No   • Drug use: No   • Sexual activity: Not Currently     Partners: Female   Other Topics Concern   • Not on file   Social History Narrative   • Not on file     Social Drivers of Health     Financial Resource Strain: Low Risk  (2024)    Overall Financial Resource Strain (CARDIA)    • Difficulty of Paying Living Expenses: Not hard at all   Food Insecurity: No Food Insecurity (2025)    Hunger Vital Sign    • Worried About Running Out of Food in the Last Year: Never true    • Ran Out of Food in the Last Year: Never true   Transportation Needs: No Transportation Needs (2025)    PRAPARE - Transportation    • Lack of Transportation (Medical): No    • Lack of Transportation (Non-Medical): No   Physical Activity: Sufficiently Active (2025)    Exercise Vital Sign    • Days of Exercise per Week: 6 days    • Minutes of Exercise per Session: 30 min   Stress: Not on file   Social Connections: Unknown (3/20/2021)    Received from [facility]    Social Connections    • Frequency of Communication with Friends and Family: Not asked    • Frequency of Social Gatherings with Friends and Family: Not asked   Intimate Partner Violence: Unknown (3/20/2021)    Received from [facility]    Intimate Partner Violence    • Fear of Current or Ex-Partner: Not

## 2025-06-02 RX ORDER — METFORMIN HYDROCHLORIDE 500 MG/1
1000 TABLET, EXTENDED RELEASE ORAL DAILY
Qty: 180 TABLET | Refills: 1 | Status: SHIPPED | OUTPATIENT
Start: 2025-06-02

## 2025-08-13 DIAGNOSIS — R79.89 ELEVATED TSH: ICD-10-CM

## 2025-08-13 DIAGNOSIS — E11.9 TYPE 2 DIABETES MELLITUS WITHOUT COMPLICATION, WITHOUT LONG-TERM CURRENT USE OF INSULIN (HCC): ICD-10-CM

## 2025-08-13 DIAGNOSIS — C61 PROSTATE CANCER (HCC): ICD-10-CM

## 2025-08-13 DIAGNOSIS — E08.65 DIABETES MELLITUS DUE TO UNDERLYING CONDITION WITH HYPERGLYCEMIA, WITHOUT LONG-TERM CURRENT USE OF INSULIN (HCC): ICD-10-CM

## 2025-08-13 DIAGNOSIS — E78.2 HYPERLIPIDEMIA, MIXED: ICD-10-CM

## 2025-08-13 LAB
ANION GAP SERPL CALC-SCNC: 14 MMOL/L (ref 7–16)
BUN SERPL-MCNC: 14 MG/DL (ref 8–23)
CALCIUM SERPL-MCNC: 8.9 MG/DL (ref 8.8–10.2)
CHLORIDE SERPL-SCNC: 98 MMOL/L (ref 98–107)
CHOLEST SERPL-MCNC: 161 MG/DL (ref 0–200)
CO2 SERPL-SCNC: 24 MMOL/L (ref 20–29)
CREAT SERPL-MCNC: 1.06 MG/DL (ref 0.8–1.3)
EST. AVERAGE GLUCOSE BLD GHB EST-MCNC: 175 MG/DL
GLUCOSE SERPL-MCNC: 133 MG/DL (ref 70–99)
HBA1C MFR BLD: 7.7 % (ref 0–5.6)
HDLC SERPL-MCNC: 34 MG/DL (ref 40–60)
HDLC SERPL: 4.7 (ref 0–5)
LDLC SERPL CALC-MCNC: 89 MG/DL (ref 0–100)
POTASSIUM SERPL-SCNC: 4.5 MMOL/L (ref 3.5–5.1)
PSA SERPL-MCNC: <0.1 NG/ML (ref 0–4)
SODIUM SERPL-SCNC: 136 MMOL/L (ref 136–145)
T4 FREE SERPL-MCNC: 1 NG/DL (ref 0.9–1.7)
TRIGL SERPL-MCNC: 188 MG/DL (ref 0–150)
TSH W FREE THYROID IF ABNORMAL: 8.34 UIU/ML (ref 0.27–4.2)
VLDLC SERPL CALC-MCNC: 38 MG/DL (ref 6–23)

## 2025-08-20 ENCOUNTER — OFFICE VISIT (OUTPATIENT)
Dept: INTERNAL MEDICINE CLINIC | Facility: CLINIC | Age: 85
End: 2025-08-20

## 2025-08-20 VITALS
WEIGHT: 218 LBS | BODY MASS INDEX: 33.04 KG/M2 | OXYGEN SATURATION: 96 % | HEIGHT: 68 IN | DIASTOLIC BLOOD PRESSURE: 76 MMHG | HEART RATE: 82 BPM | SYSTOLIC BLOOD PRESSURE: 132 MMHG

## 2025-08-20 DIAGNOSIS — E11.9 TYPE 2 DIABETES MELLITUS WITHOUT COMPLICATION, WITHOUT LONG-TERM CURRENT USE OF INSULIN (HCC): ICD-10-CM

## 2025-08-20 DIAGNOSIS — E03.8 SUBCLINICAL HYPOTHYROIDISM: Primary | ICD-10-CM

## 2025-08-20 DIAGNOSIS — C61 PROSTATE CANCER (HCC): ICD-10-CM

## 2025-08-20 DIAGNOSIS — E78.2 HYPERLIPIDEMIA, MIXED: ICD-10-CM

## 2025-08-20 DIAGNOSIS — K21.9 GASTROESOPHAGEAL REFLUX DISEASE, UNSPECIFIED WHETHER ESOPHAGITIS PRESENT: ICD-10-CM

## 2025-08-20 DIAGNOSIS — I10 HYPERTENSION, ESSENTIAL: ICD-10-CM

## 2025-08-20 DIAGNOSIS — I10 ESSENTIAL HYPERTENSION: ICD-10-CM

## 2025-08-20 RX ORDER — TRIAMTERENE AND HYDROCHLOROTHIAZIDE 37.5; 25 MG/1; MG/1
1 TABLET ORAL DAILY
Qty: 90 TABLET | Refills: 2 | Status: SHIPPED | OUTPATIENT
Start: 2025-08-20

## 2025-08-20 ASSESSMENT — ENCOUNTER SYMPTOMS
RECTAL PAIN: 0
EYE PAIN: 0
CHOKING: 0
VOICE CHANGE: 0
STRIDOR: 0